# Patient Record
Sex: FEMALE | Race: WHITE | NOT HISPANIC OR LATINO
[De-identification: names, ages, dates, MRNs, and addresses within clinical notes are randomized per-mention and may not be internally consistent; named-entity substitution may affect disease eponyms.]

---

## 2018-10-02 ENCOUNTER — TRANSCRIPTION ENCOUNTER (OUTPATIENT)
Age: 63
End: 2018-10-02

## 2018-10-03 ENCOUNTER — OUTPATIENT (OUTPATIENT)
Dept: OUTPATIENT SERVICES | Facility: HOSPITAL | Age: 63
LOS: 1 days | End: 2018-10-03
Payer: MEDICARE

## 2018-10-03 VITALS
DIASTOLIC BLOOD PRESSURE: 78 MMHG | HEIGHT: 63 IN | HEART RATE: 70 BPM | OXYGEN SATURATION: 97 % | TEMPERATURE: 98 F | WEIGHT: 217.82 LBS | RESPIRATION RATE: 20 BRPM | SYSTOLIC BLOOD PRESSURE: 147 MMHG

## 2018-10-03 VITALS
RESPIRATION RATE: 17 BRPM | OXYGEN SATURATION: 97 % | HEART RATE: 75 BPM | DIASTOLIC BLOOD PRESSURE: 77 MMHG | SYSTOLIC BLOOD PRESSURE: 131 MMHG

## 2018-10-03 DIAGNOSIS — H25.11 AGE-RELATED NUCLEAR CATARACT, RIGHT EYE: ICD-10-CM

## 2018-10-03 DIAGNOSIS — Z98.49 CATARACT EXTRACTION STATUS, UNSPECIFIED EYE: Chronic | ICD-10-CM

## 2018-10-03 DIAGNOSIS — Z90.49 ACQUIRED ABSENCE OF OTHER SPECIFIED PARTS OF DIGESTIVE TRACT: Chronic | ICD-10-CM

## 2018-10-03 PROCEDURE — 66984 XCAPSL CTRC RMVL W/O ECP: CPT | Mod: RT

## 2018-10-03 PROCEDURE — V2788: CPT

## 2018-10-03 NOTE — ASU PATIENT PROFILE, ADULT - PSH
Arm fracture  Left Arm  Foot injury  Right Foot spur repair.  S/P cataract extraction  2013  S/P cholecystectomy

## 2018-10-03 NOTE — ASU PATIENT PROFILE, ADULT - PMH
Cholecystitis    COPD (chronic obstructive pulmonary disease)    Diabetes  pre- diabetes  Hyperlipidemia    Hypertension    Hypothyroidism

## 2018-10-03 NOTE — ASU DISCHARGE PLAN (ADULT/PEDIATRIC). - PT EDUC
Implant card (specify)/Intraocular lens implant (IOL)  , Eye shield with instructions , sunglasses and eye kit given to patient.

## 2021-01-23 ENCOUNTER — FORM ENCOUNTER (OUTPATIENT)
Age: 66
End: 2021-01-23

## 2021-01-24 ENCOUNTER — TRANSCRIPTION ENCOUNTER (OUTPATIENT)
Age: 66
End: 2021-01-24

## 2021-01-26 ENCOUNTER — OUTPATIENT (OUTPATIENT)
Dept: OUTPATIENT SERVICES | Facility: HOSPITAL | Age: 66
LOS: 1 days | End: 2021-01-26
Payer: MEDICARE

## 2021-01-26 ENCOUNTER — APPOINTMENT (OUTPATIENT)
Dept: DISASTER EMERGENCY | Facility: HOSPITAL | Age: 66
End: 2021-01-26

## 2021-01-26 VITALS
SYSTOLIC BLOOD PRESSURE: 157 MMHG | DIASTOLIC BLOOD PRESSURE: 99 MMHG | HEIGHT: 63 IN | HEART RATE: 78 BPM | WEIGHT: 207.01 LBS | TEMPERATURE: 98 F | OXYGEN SATURATION: 97 % | RESPIRATION RATE: 20 BRPM

## 2021-01-26 VITALS
TEMPERATURE: 98 F | SYSTOLIC BLOOD PRESSURE: 130 MMHG | OXYGEN SATURATION: 98 % | DIASTOLIC BLOOD PRESSURE: 80 MMHG | RESPIRATION RATE: 16 BRPM

## 2021-01-26 DIAGNOSIS — U07.1 COVID-19: ICD-10-CM

## 2021-01-26 DIAGNOSIS — Z90.49 ACQUIRED ABSENCE OF OTHER SPECIFIED PARTS OF DIGESTIVE TRACT: Chronic | ICD-10-CM

## 2021-01-26 DIAGNOSIS — Z98.49 CATARACT EXTRACTION STATUS, UNSPECIFIED EYE: Chronic | ICD-10-CM

## 2021-01-26 PROCEDURE — M0239: CPT

## 2021-01-26 RX ORDER — BAMLANIVIMAB 35 MG/ML
700 INJECTION, SOLUTION INTRAVENOUS ONCE
Refills: 0 | Status: COMPLETED | OUTPATIENT
Start: 2021-01-26 | End: 2021-01-26

## 2021-01-26 RX ADMIN — BAMLANIVIMAB 270 MILLIGRAM(S): 35 INJECTION, SOLUTION INTRAVENOUS at 11:28

## 2021-01-26 NOTE — CHART NOTE - NSCHARTNOTEFT_GEN_A_CORE
CC: Monoclonal Antibody Infusion/COVID 19 Positive  65yFemale with PMHx --- and symptoms     exam/findings:  T(C): 36.5 (01-26-21 @ 10:35), Max: 36.5 (01-26-21 @ 10:35)  HR: 78 (01-26-21 @ 10:35) (78 - 78)  BP: 157/99 (01-26-21 @ 10:35) (157/99 - 157/99)  RR: 20 (01-26-21 @ 10:35) (20 - 20)  SpO2: 97% (01-26-21 @ 10:35) (97% - 97%)      PE:   Appearance: NAD	  HEENT:   Normal oral mucosa,   Lymphatic: No lymphadenopathy  Cardiovascular: Normal S1 S2, No JVD, No murmurs, No edema  Respiratory: Lungs clear to auscultation	  Gastrointestinal:  Soft, Non-tender, + BS	  Skin: warm and dry  Neurologic: Non-focal  Extremities: Normal range of motion, no calf tenderness or edema    ASSESSMENT:  Pt is a 65y with PMHx of , Covid 19 Positive with symptoms in the last 10 days, who presents to infusion center for Monoclonal antibody infusion Bamlanivimab  Symptoms/ Criteria: Sore throat, Malaise, HA  Risk Profile includes: Age >65    PLAN:  - infusion procedure explained to patient   -Consent for monoclonal antibody infusion obtained   - Risk & benifits discussed/all questions answered  -infuse  Bamlanivimab  700mg IV over one hour   -observe patient for one hour post infusion      I have reviewed the Bamlanivimab Emergency Use Authorization (EAU) and I have provided the patient or patient's caregiver with the following information:  1. FDA has authorized emergency use of Bamlanivimab, which is not FDA-approved biologic product.  2. The patient or patient's caregiver has the option to accept or refuse administration of Bamlanivimab  3. The significant known and benefits are unknown.  4. Information on available alternative treatments and risks and benefits of those alternatives.

## 2021-01-27 ENCOUNTER — TRANSCRIPTION ENCOUNTER (OUTPATIENT)
Age: 66
End: 2021-01-27

## 2021-10-28 ENCOUNTER — TRANSCRIPTION ENCOUNTER (OUTPATIENT)
Age: 66
End: 2021-10-28

## 2022-07-25 ENCOUNTER — NON-APPOINTMENT (OUTPATIENT)
Age: 67
End: 2022-07-25

## 2022-12-10 ENCOUNTER — NON-APPOINTMENT (OUTPATIENT)
Age: 67
End: 2022-12-10

## 2022-12-22 ENCOUNTER — NON-APPOINTMENT (OUTPATIENT)
Age: 67
End: 2022-12-22

## 2022-12-28 ENCOUNTER — INPATIENT (INPATIENT)
Facility: HOSPITAL | Age: 67
LOS: 2 days | Discharge: HOME | DRG: 193 | End: 2022-12-31
Attending: HOSPITALIST | Admitting: STUDENT IN AN ORGANIZED HEALTH CARE EDUCATION/TRAINING PROGRAM
Payer: MEDICARE

## 2022-12-28 VITALS
HEART RATE: 70 BPM | OXYGEN SATURATION: 94 % | RESPIRATION RATE: 17 BRPM | HEIGHT: 64 IN | TEMPERATURE: 98 F | SYSTOLIC BLOOD PRESSURE: 159 MMHG | WEIGHT: 210.1 LBS | DIASTOLIC BLOOD PRESSURE: 80 MMHG

## 2022-12-28 DIAGNOSIS — J18.9 PNEUMONIA, UNSPECIFIED ORGANISM: ICD-10-CM

## 2022-12-28 DIAGNOSIS — Z98.49 CATARACT EXTRACTION STATUS, UNSPECIFIED EYE: Chronic | ICD-10-CM

## 2022-12-28 DIAGNOSIS — Z90.49 ACQUIRED ABSENCE OF OTHER SPECIFIED PARTS OF DIGESTIVE TRACT: Chronic | ICD-10-CM

## 2022-12-28 LAB
ALBUMIN SERPL ELPH-MCNC: 3.8 G/DL — SIGNIFICANT CHANGE UP (ref 3.3–5)
ALP SERPL-CCNC: 76 U/L — SIGNIFICANT CHANGE UP (ref 40–120)
ALT FLD-CCNC: 50 U/L — SIGNIFICANT CHANGE UP (ref 12–78)
ANION GAP SERPL CALC-SCNC: 7 MMOL/L — SIGNIFICANT CHANGE UP (ref 5–17)
APTT BLD: 30.8 SEC — SIGNIFICANT CHANGE UP (ref 27.5–35.5)
AST SERPL-CCNC: 40 U/L — HIGH (ref 15–37)
BASOPHILS # BLD AUTO: 0.06 K/UL — SIGNIFICANT CHANGE UP (ref 0–0.2)
BASOPHILS NFR BLD AUTO: 0.6 % — SIGNIFICANT CHANGE UP (ref 0–2)
BILIRUB SERPL-MCNC: 0.7 MG/DL — SIGNIFICANT CHANGE UP (ref 0.2–1.2)
BUN SERPL-MCNC: 12 MG/DL — SIGNIFICANT CHANGE UP (ref 7–23)
CALCIUM SERPL-MCNC: 9.2 MG/DL — SIGNIFICANT CHANGE UP (ref 8.5–10.1)
CHLORIDE SERPL-SCNC: 103 MMOL/L — SIGNIFICANT CHANGE UP (ref 96–108)
CK MB CFR SERPL CALC: 1.2 NG/ML — SIGNIFICANT CHANGE UP (ref 0–3.6)
CO2 SERPL-SCNC: 31 MMOL/L — SIGNIFICANT CHANGE UP (ref 22–31)
CREAT SERPL-MCNC: 0.77 MG/DL — SIGNIFICANT CHANGE UP (ref 0.5–1.3)
EGFR: 83 ML/MIN/1.73M2 — SIGNIFICANT CHANGE UP
EOSINOPHIL # BLD AUTO: 0.15 K/UL — SIGNIFICANT CHANGE UP (ref 0–0.5)
EOSINOPHIL NFR BLD AUTO: 1.5 % — SIGNIFICANT CHANGE UP (ref 0–6)
GLUCOSE SERPL-MCNC: 106 MG/DL — HIGH (ref 70–99)
HCT VFR BLD CALC: 47.8 % — HIGH (ref 34.5–45)
HGB BLD-MCNC: 15.2 G/DL — SIGNIFICANT CHANGE UP (ref 11.5–15.5)
IMM GRANULOCYTES NFR BLD AUTO: 1.6 % — HIGH (ref 0–0.9)
INR BLD: 1.22 RATIO — HIGH (ref 0.88–1.16)
LACTATE SERPL-SCNC: 1.2 MMOL/L — SIGNIFICANT CHANGE UP (ref 0.7–2)
LYMPHOCYTES # BLD AUTO: 1.96 K/UL — SIGNIFICANT CHANGE UP (ref 1–3.3)
LYMPHOCYTES # BLD AUTO: 19.2 % — SIGNIFICANT CHANGE UP (ref 13–44)
MAGNESIUM SERPL-MCNC: 2.3 MG/DL — SIGNIFICANT CHANGE UP (ref 1.6–2.6)
MCHC RBC-ENTMCNC: 28.5 PG — SIGNIFICANT CHANGE UP (ref 27–34)
MCHC RBC-ENTMCNC: 31.8 GM/DL — LOW (ref 32–36)
MCV RBC AUTO: 89.7 FL — SIGNIFICANT CHANGE UP (ref 80–100)
MONOCYTES # BLD AUTO: 0.69 K/UL — SIGNIFICANT CHANGE UP (ref 0–0.9)
MONOCYTES NFR BLD AUTO: 6.8 % — SIGNIFICANT CHANGE UP (ref 2–14)
NEUTROPHILS # BLD AUTO: 7.19 K/UL — SIGNIFICANT CHANGE UP (ref 1.8–7.4)
NEUTROPHILS NFR BLD AUTO: 70.3 % — SIGNIFICANT CHANGE UP (ref 43–77)
NRBC # BLD: 0 /100 WBCS — SIGNIFICANT CHANGE UP (ref 0–0)
NT-PROBNP SERPL-SCNC: 272 PG/ML — HIGH (ref 0–125)
PLATELET # BLD AUTO: 346 K/UL — SIGNIFICANT CHANGE UP (ref 150–400)
POTASSIUM SERPL-MCNC: 4.2 MMOL/L — SIGNIFICANT CHANGE UP (ref 3.5–5.3)
POTASSIUM SERPL-SCNC: 4.2 MMOL/L — SIGNIFICANT CHANGE UP (ref 3.5–5.3)
PROT SERPL-MCNC: 8.1 G/DL — SIGNIFICANT CHANGE UP (ref 6–8.3)
PROTHROM AB SERPL-ACNC: 14.3 SEC — HIGH (ref 10.5–13.4)
RBC # BLD: 5.33 M/UL — HIGH (ref 3.8–5.2)
RBC # FLD: 13.6 % — SIGNIFICANT CHANGE UP (ref 10.3–14.5)
SODIUM SERPL-SCNC: 141 MMOL/L — SIGNIFICANT CHANGE UP (ref 135–145)
TROPONIN I, HIGH SENSITIVITY RESULT: 6.8 NG/L — SIGNIFICANT CHANGE UP
WBC # BLD: 10.21 K/UL — SIGNIFICANT CHANGE UP (ref 3.8–10.5)
WBC # FLD AUTO: 10.21 K/UL — SIGNIFICANT CHANGE UP (ref 3.8–10.5)

## 2022-12-28 PROCEDURE — 93010 ELECTROCARDIOGRAM REPORT: CPT

## 2022-12-28 PROCEDURE — 99291 CRITICAL CARE FIRST HOUR: CPT

## 2022-12-28 PROCEDURE — 99223 1ST HOSP IP/OBS HIGH 75: CPT | Mod: GC

## 2022-12-28 PROCEDURE — 71045 X-RAY EXAM CHEST 1 VIEW: CPT | Mod: 26

## 2022-12-28 RX ORDER — AZITHROMYCIN 500 MG/1
500 TABLET, FILM COATED ORAL ONCE
Refills: 0 | Status: COMPLETED | OUTPATIENT
Start: 2022-12-28 | End: 2022-12-28

## 2022-12-28 RX ORDER — ENOXAPARIN SODIUM 100 MG/ML
40 INJECTION SUBCUTANEOUS EVERY 24 HOURS
Refills: 0 | Status: DISCONTINUED | OUTPATIENT
Start: 2022-12-28 | End: 2022-12-31

## 2022-12-28 RX ORDER — ALBUTEROL 90 UG/1
2 AEROSOL, METERED ORAL EVERY 6 HOURS
Refills: 0 | Status: DISCONTINUED | OUTPATIENT
Start: 2022-12-28 | End: 2022-12-29

## 2022-12-28 RX ORDER — SODIUM CHLORIDE 9 MG/ML
1000 INJECTION INTRAMUSCULAR; INTRAVENOUS; SUBCUTANEOUS ONCE
Refills: 0 | Status: COMPLETED | OUTPATIENT
Start: 2022-12-28 | End: 2022-12-28

## 2022-12-28 RX ORDER — ACETAMINOPHEN 500 MG
650 TABLET ORAL EVERY 6 HOURS
Refills: 0 | Status: DISCONTINUED | OUTPATIENT
Start: 2022-12-28 | End: 2022-12-31

## 2022-12-28 RX ORDER — LEVOTHYROXINE SODIUM 125 MCG
1 TABLET ORAL
Qty: 0 | Refills: 0 | DISCHARGE

## 2022-12-28 RX ORDER — LEVOTHYROXINE SODIUM 125 MCG
100 TABLET ORAL DAILY
Refills: 0 | Status: DISCONTINUED | OUTPATIENT
Start: 2022-12-28 | End: 2022-12-31

## 2022-12-28 RX ORDER — IPRATROPIUM/ALBUTEROL SULFATE 18-103MCG
3 AEROSOL WITH ADAPTER (GRAM) INHALATION ONCE
Refills: 0 | Status: COMPLETED | OUTPATIENT
Start: 2022-12-28 | End: 2022-12-28

## 2022-12-28 RX ORDER — ALBUTEROL 90 UG/1
2 AEROSOL, METERED ORAL
Qty: 0 | Refills: 0 | DISCHARGE

## 2022-12-28 RX ORDER — CEFTRIAXONE 500 MG/1
1000 INJECTION, POWDER, FOR SOLUTION INTRAMUSCULAR; INTRAVENOUS ONCE
Refills: 0 | Status: COMPLETED | OUTPATIENT
Start: 2022-12-28 | End: 2022-12-28

## 2022-12-28 RX ADMIN — Medication 3 MILLILITER(S): at 21:27

## 2022-12-28 RX ADMIN — SODIUM CHLORIDE 1000 MILLILITER(S): 9 INJECTION INTRAMUSCULAR; INTRAVENOUS; SUBCUTANEOUS at 20:24

## 2022-12-28 RX ADMIN — AZITHROMYCIN 255 MILLIGRAM(S): 500 TABLET, FILM COATED ORAL at 22:21

## 2022-12-28 RX ADMIN — Medication 3 MILLILITER(S): at 21:23

## 2022-12-28 RX ADMIN — CEFTRIAXONE 100 MILLIGRAM(S): 500 INJECTION, POWDER, FOR SOLUTION INTRAMUSCULAR; INTRAVENOUS at 22:06

## 2022-12-28 RX ADMIN — Medication 125 MILLIGRAM(S): at 20:23

## 2022-12-28 NOTE — H&P ADULT - PROBLEM SELECTOR PLAN 2
Acute hypoxic respiratory failure 2/2 PNA and COPD exacerbation  - Pt not on home O2  - S/p Solumedrol 125 mg IVP x1 in ED  - Start Solumedrol 60 mg IVP q6h with plans for taper  - Pulmonary (Dr. Whaley) consulted, f/u recs  Remainder of plan as above Acute hypoxic respiratory failure 2/2 PNA and COPD exacerbation  - Pt not on home O2  - S/p Solumedrol 125 mg IVP x1 in ED  - Start Solumedrol 40 mg IVP q8h with plans for taper  - Pulmonary (Dr. Whaley) consulted, f/u recs  Remainder of plan as above Acute hypoxic respiratory failure 2/2 PNA and COPD exacerbation  - Pt not on home O2  - S/p Solumedrol 125 mg IVP x1 in ED  - Start Solumedrol 40 mg IVP q8h with plans for taper  - duonebs q6   - Pulmonary (Dr. Whaley) consulted, f/u recs  Remainder of plan as above

## 2022-12-28 NOTE — ED ADULT NURSE NOTE - NSICDXPASTSURGICALHX_GEN_ALL_CORE_FT
PAST SURGICAL HISTORY:  Arm fracture Left Arm    Foot injury Right Foot spur repair.    S/P cataract extraction 2013    S/P cholecystectomy

## 2022-12-28 NOTE — ED PROVIDER NOTE - NSICDXPASTSURGICALHX_GEN_ALL_CORE_FT
never used
PAST SURGICAL HISTORY:  Arm fracture Left Arm    Foot injury Right Foot spur repair.    S/P cataract extraction 2013    S/P cholecystectomy

## 2022-12-28 NOTE — ED PROVIDER NOTE - CARE PLAN
1 Principal Discharge DX:	Pneumonia  Secondary Diagnosis:	Respiratory distress  Secondary Diagnosis:	Respiratory failure, unspecified with hypoxia

## 2022-12-28 NOTE — ED ADULT NURSE NOTE - OBJECTIVE STATEMENT
67yr old female a&ox4 from home noted to have been dx with pneumonia and on PO antibx since Friday, but notes weekends and cant being able to catch breath, no wheezing noted pt noted to be able to speak in full sentences without becoming sob, pt denies chest pain or sob at this time, pt admit to hx of copd. Pt denies fever chills, n/c at this time.

## 2022-12-28 NOTE — ED PROVIDER NOTE - CLINICAL SUMMARY MEDICAL DECISION MAKING FREE TEXT BOX
67-year-old female history of COPD complaining of cough congestion wheezing, O2 sats in the 80s, placed on nasal cannula oxygen, to get DuoNeb's, steroids, send CBC, CMP, blood cultures, lactate level, chest x-ray, treat for pneumonia, patient to be admitted.

## 2022-12-28 NOTE — ED ADULT NURSE NOTE - NSICDXFAMILYHX_GEN_ALL_CORE_FT
FAMILY HISTORY:  Father  Still living? No  Family history of stroke, Age at diagnosis: Age Unknown    Mother  Still living? Yes, Estimated age: 81-90  Hypertension, Age at diagnosis: Age Unknown    Sibling  Still living? Yes, Estimated age: 61-70  Family history of cervical cancer, Age at diagnosis: Age Unknown  Family history of coronary artery disease, Age at diagnosis: Age Unknown

## 2022-12-28 NOTE — ED PROVIDER NOTE - CPE EDP NEURO NORM
normal...

## 2022-12-28 NOTE — ED ADULT TRIAGE NOTE - CHIEF COMPLAINT QUOTE
"I had an XRAY of my chest and they say I have pneumonia in my right lower lung. Teresa been on antibx since Friday but I feel weaker and I cant catch my breath"

## 2022-12-28 NOTE — ED ADULT NURSE NOTE - NS ED NOTE ABUSE RESPONSE YN
Physical Therapy  Visit Type: initial evaluation and treatment  Precautions:  Medical precautions:  fall risk;.  Pt adm to [facility] cellulitis   PT/OT vidal AAT    Therapist wearing gloves and surgical mask during session.    Patient was NOT wearing mask throughout duration of therapy session  .     Lines:     Basic: capped IV      Lines in chart and on patient reviewed, precautions maintained throughout session.  Safety Measures: bed alarm and bed rails    SUBJECTIVE  Patient agreed to participate in therapy this date.  \"My daughter came to live with me when my  \"  Patient / Family Goal: maximize function     OBJECTIVE     Oriented to person, place, time and situation     Affect/Behavior: appropriate and cooperative  Functional Communication/Cognition    Overall status:  Within functional limits    Form of communication:  Verbal   Attention span:  Appears intact    Commands: follows all commands and directions consistently.    Transition between tasks: transition with cues.    Safety judgement: good awareness of safety precautions.    Awareness of deficits: fully aware of deficits.  Range of Motion (measured in degrees unless otherwise noted, active unless indicated)  WFL: RLE, LLE  Strength (out of 5 unless otherwise indicated)   WFL: LLE, RLE  Balance (trials measured in sec unless otherwise indicted)    Sitting: Static: supervision double lower extremity support and back unsupported    Standing - Firm Surface - Eyes Open: Static: supervision double upper extremity support    Bed Mobility:        Supine to sit: moderate assist, with tactile cues and with verbal cues    Sit to supine: moderate assist, with tactile cues and with verbal cues  Training completed:    Tasks: sit to supine and supine to sit    Education details: body mechanics and patient safety    Pt sleeps in a recliner lift chair  Transfers:    Assistive devices: 2-wheeled walker and gait belt    Sit to stand: supervision, with  verbal cues and with tactile cues    Stand to sit: supervision, with verbal cues and with tactile cues  Training completed:    Tasks: sit to stand and stand to sit    Education details: patient safety and patient demonstrates understanding  Gait/Ambulation:     Assistance: supervision   Assistive device: 2-wheeled walker and gait belt    Distance (ft): 30    Pattern: step through    Type: decreased iveth    Surface: even  Training Completed:    Tasks: gait training on level surfaces    Education details: body mechanics, patient safety and patient demonstrates understanding    Pt states her walking is at baseline. She amb with rollator at home and stops for rest breaks when needed. Does short household distances  Stair Mobility:    Training completed:      Not addressed d/t fatigue. Pt will have assist from family to enter home 1STE.       Interventions     Skilled input: Verbal instruction/cues and tactile instruction/cues  Verbal Consent: Writer verbally educated and received verbal consent for hand placement, positioning of patient, and techniques to be performed today from patient for clothing adjustments for techniques and therapist position for techniques as described above and how they are pertinent to the patient's plan of care.       ASSESSMENT   Impairments: safety awareness, balance deficits and activity tolerance  Functional Limitations: all functional mobility     Discharge Recommendations   Recommendation for Discharge: PT IL: Patient requires 24 HOUR assistance to perform mobility and/or ADLs safely        PT/OT Mobility Equipment for Discharge: has Rollator          Therapy Diagnosis:  Other Abnormalities of Gait and Mobility      Progress: progressing toward goals     • Skilled therapy is not required due to pt at baseline with 24/7 assist      • Predicted patient presentation: Low (stable) - Patient comorbidities and complexities, as defined above, will have little effect on progress for  prescribed plan of care.    Education Provided:   Learning Assessment:  - Primary learner: patient  - Are they ready to learn: yes  - Preferred learning style: verbal  Education provided during session:  - Receiving Education: patient  - Results of above outlined education: Verbalizes understanding and Demonstrates understanding    Patient at End of Session:   Location: in bed  Safety measures: alarm system in place/re-engaged, call light within reach and bed rails x4  Handoff to: nurse    PLAN   Suggestions for next session as indicated: Frequency Comments: d/c INPT as pt is at baseline and has 24/7 assist at discharge home 9.4    A minimum of 8 minutes per session x 1 week.  Interventions: bed mobility, functional transfer training, safety education and gait training  Agreement to plan and goals: patient agrees with goals and treatment plan        GOALS  Review Date: 9/4/2022Documented in the chart in the following areas: Prior Level of Function. Pain. Assessment.      Therapy procedure time and total treatment time can be found documented on the Time Entry flowsheet   Yes

## 2022-12-28 NOTE — ED ADULT NURSE NOTE - NSICDXPASTMEDICALHX_GEN_ALL_CORE_FT
PAST MEDICAL HISTORY:  Cholecystitis     COPD (chronic obstructive pulmonary disease)     Diabetes pre- diabetes    Hyperlipidemia     Hypertension     Hypothyroidism

## 2022-12-28 NOTE — ED PROVIDER NOTE - OBJECTIVE STATEMENT
67-year-old female past medical history of hypertension, COPD, former smoker, hypothyroid, presents to the ER complaining of shortness of breath cough congestion.  Patient states that on Friday he developed cough and congestion and went to urgent care tested negative for COVID was given albuterol inhaler and placed on Levaquin.  Patient states that today she feels worse, feels short of breath generalized weakness and came to the ER.

## 2022-12-28 NOTE — H&P ADULT - ATTENDING COMMENTS
67 year old female PMHx HTN, mild COPD, hypothyroidism admitted for acute hypoxic respiratory failure 2/2 PNA and COPD exacerbation.     Agree with H&P as above. Edited where appropriate in the body of the note.

## 2022-12-28 NOTE — H&P ADULT - HISTORY OF PRESENT ILLNESS
67 year old female PMHx HTN, mild COPD, hypothyroidism presents to ED c/o shortness of breath. Pt reports she developed fever/chills, productive cough with white phlegm, myalgias, shortness of breath on Fri 12/23. She presented to urgent care on Fri and had a CXR which revealed RLL PNA. She was started on Levaquin 750mg qd x7 days. Pt has been taking the abx as prescribed with little improvement. She admits her shortness of breath has been worsening which prompted her to present to the ED. Pt admits she has been feeling anxious and using PRN Albuterol 4-6x daily. She denies fevers/chills, headaches, sore throat, chest pain, abdominal pain, nausea/vomiting.     ED course:  Vitals: T 97.7F, HR 70, /80, RR 17, SpO2 94% on 2L NC  Labs: RBC 5.33, Hct 47.8, PT 14.3, INR 1.22, glucose 106, AST 40, proBNP 272  EKG: pending  CXR: RLL infiltrates on CXR per wet read  Given: IV Zithromax 500mg x1, IV Rocephin 1x1, IV Solumedrol 125mg x1, IV NS 1L x1, Duoneb x2 67 year old female PMHx HTN, mild COPD, hypothyroidism presents to ED c/o shortness of breath. Pt reports she developed fever/chills, productive cough with white phlegm, myalgias, shortness of breath on Fri 12/23. She presented to urgent care on Fri and had a CXR which revealed RLL PNA. She was started on Levaquin 750mg qd x7 days. Pt has been taking the abx as prescribed with little improvement. She admits her shortness of breath has been worsening which prompted her to present to the ED. Pt admits she has been feeling anxious and using PRN Albuterol 4-6x daily. She denies fevers/chills, headaches, sore throat, chest pain, abdominal pain, nausea/vomiting.     ED course:  Vitals: T 97.7F, HR 70, /80, RR 17, SpO2 94% on 2L NC  Labs: RBC 5.33, Hct 47.8, PT 14.3, INR 1.22, glucose 106, AST 40, proBNP 272  CXR: RLL infiltrates on CXR per wet read  Given: IV Zithromax 500mg x1, IV Rocephin 1x1, IV Solumedrol 125mg x1, IV NS 1L x1, Duoneb x2

## 2022-12-28 NOTE — H&P ADULT - PROBLEM SELECTOR PLAN 1
Pt presents with SOB, wheezing due to Acute hypoxic respiratory failure 2/2 PNA and COPD exacerbation  - RLL infiltrates on CXR per wet read. F/u official read  - Given IV Zithromax 500mg x1, IV Rocephin 1x1, IV Solumedrol 125mg x1, IV NS 1L x1, Duoneb x2 in ED  - Continue IV Zithromax and IV Rocephin   - Duonebs q6h   - F/u RVP  - Follow up blood cultures, legionella urine antigen, strep pneumo urine antigen, MRSA PCR  - Tylenol 650 mg PO q6h PRN mild pain and/or fever  - Albuterol PRN SOB and/or wheezing  - Supplemental O2 PRN  - Monitor fever and WBC curve  - ID (Dr. Blakely) consulted, f/u recs Pt presents with SOB, wheezing due to Acute hypoxic respiratory failure 2/2 PNA and COPD exacerbation  - O2 sat in the 80s on RA   - RLL infiltrates on CXR per wet read. F/u official read  - Given IV Zithromax 500mg x1, IV Rocephin 1x1, IV Solumedrol 125mg x1, IV NS 1L x1, Duoneb x2 in ED  - Continue IV Zithromax and IV Rocephin   - Duonebs q6h   - F/u RVP  - Follow up blood cultures, legionella urine antigen, strep pneumo urine antigen, MRSA PCR  - Tylenol 650 mg PO q6h PRN mild pain and/or fever  - Albuterol PRN SOB and/or wheezing  - Supplemental O2 PRN  - Monitor fever and WBC curve  - ID (Dr. Blakely) consulted, f/u recs

## 2022-12-28 NOTE — H&P ADULT - NSHPREVIEWOFSYSTEMS_GEN_ALL_CORE
General: no fever, chills, changes in appetite  HEENT: admits to cough productive of white phlegm and rhinorrhea. no sore throat, headache, changes in vision  Cardio: no palpitations, pallor, chest pain or discomfort  Pulm: admits to shortness of breath and wheezing  GI: no vomiting, diarrhea, abdominal pain   /Renal: no dysuria, foul smelling urine, increased frequency, flank pain  MSK: no back or extremity pain, no edema, joint pain or swelling, gait changes  Endo: no temperature intolerance  Heme: no bruising or abnormal bleeding  Skin: no rash

## 2022-12-28 NOTE — H&P ADULT - NSICDXPASTMEDICALHX_GEN_ALL_CORE_FT
PAST MEDICAL HISTORY:  COPD (chronic obstructive pulmonary disease)     Hypertension     Hypothyroidism

## 2022-12-28 NOTE — H&P ADULT - ASSESSMENT
67 year old female PMHx HTN, mild COPD, hypothyroidism admitted for acute hypoxic respiratory failure 2/2 PNA and COPD exacerbation.

## 2022-12-28 NOTE — H&P ADULT - NSHPSOCIALHISTORY_GEN_ALL_CORE
Tobacco: quit smoking 15 years ago  EtOH: denies  Recreational drug use: denies  Lives with: fiance at home  Ambulates: independent  ADLs: independent  Vaccinations: declines flu vaccine, COVID J&J

## 2022-12-28 NOTE — H&P ADULT - NSHPPHYSICALEXAM_GEN_ALL_CORE
VITALS:   T(C): 36.5 (12-28-22 @ 18:25), Max: 36.5 (12-28-22 @ 18:25)  HR: 70 (12-28-22 @ 18:25) (70 - 70)  BP: 159/80 (12-28-22 @ 18:25) (159/80 - 159/80)  RR: 17 (12-28-22 @ 18:25) (17 - 17)  SpO2: 94% (12-28-22 @ 18:25) (94% - 94%)    GENERAL: in mild respiratory distress s/p ambulation to the bathroom  HEAD:  Atraumatic, Normocephalic  EYES: EOMI, PERRLA, conjunctiva and sclera clear  ENT: Moist mucous membranes. no pharyngeal erythema or exudates  CHEST/LUNG: Clear to auscultation bilaterally; No rales, rhonchi, wheezing, or rubs  HEART: Regular rate and rhythm; No murmurs, rubs, or gallops  ABDOMEN: Soft, nontender, nondistended  EXTREMITIES:  2+ Peripheral Pulses, No clubbing, cyanosis, or edema  NERVOUS SYSTEM:  AAOx3, no focal deficits   SKIN: warm, dry

## 2022-12-29 DIAGNOSIS — E03.9 HYPOTHYROIDISM, UNSPECIFIED: ICD-10-CM

## 2022-12-29 DIAGNOSIS — J44.9 CHRONIC OBSTRUCTIVE PULMONARY DISEASE, UNSPECIFIED: ICD-10-CM

## 2022-12-29 DIAGNOSIS — I10 ESSENTIAL (PRIMARY) HYPERTENSION: ICD-10-CM

## 2022-12-29 DIAGNOSIS — Z29.9 ENCOUNTER FOR PROPHYLACTIC MEASURES, UNSPECIFIED: ICD-10-CM

## 2022-12-29 DIAGNOSIS — J44.1 CHRONIC OBSTRUCTIVE PULMONARY DISEASE WITH (ACUTE) EXACERBATION: ICD-10-CM

## 2022-12-29 DIAGNOSIS — J96.01 ACUTE RESPIRATORY FAILURE WITH HYPOXIA: ICD-10-CM

## 2022-12-29 DIAGNOSIS — J18.9 PNEUMONIA, UNSPECIFIED ORGANISM: ICD-10-CM

## 2022-12-29 LAB
ALBUMIN SERPL ELPH-MCNC: 3.6 G/DL — SIGNIFICANT CHANGE UP (ref 3.3–5)
ALP SERPL-CCNC: 70 U/L — SIGNIFICANT CHANGE UP (ref 40–120)
ALT FLD-CCNC: 48 U/L — SIGNIFICANT CHANGE UP (ref 12–78)
ANION GAP SERPL CALC-SCNC: 8 MMOL/L — SIGNIFICANT CHANGE UP (ref 5–17)
AST SERPL-CCNC: 32 U/L — SIGNIFICANT CHANGE UP (ref 15–37)
BASE EXCESS BLDV CALC-SCNC: 4.8 MMOL/L — HIGH (ref -2–3)
BASOPHILS # BLD AUTO: 0.01 K/UL — SIGNIFICANT CHANGE UP (ref 0–0.2)
BASOPHILS NFR BLD AUTO: 0.1 % — SIGNIFICANT CHANGE UP (ref 0–2)
BILIRUB SERPL-MCNC: 0.4 MG/DL — SIGNIFICANT CHANGE UP (ref 0.2–1.2)
BLOOD GAS COMMENTS, VENOUS: SIGNIFICANT CHANGE UP
BUN SERPL-MCNC: 13 MG/DL — SIGNIFICANT CHANGE UP (ref 7–23)
CALCIUM SERPL-MCNC: 8.9 MG/DL — SIGNIFICANT CHANGE UP (ref 8.5–10.1)
CHLORIDE SERPL-SCNC: 106 MMOL/L — SIGNIFICANT CHANGE UP (ref 96–108)
CO2 SERPL-SCNC: 27 MMOL/L — SIGNIFICANT CHANGE UP (ref 22–31)
CREAT SERPL-MCNC: 0.96 MG/DL — SIGNIFICANT CHANGE UP (ref 0.5–1.3)
CRP SERPL-MCNC: <3 MG/L — SIGNIFICANT CHANGE UP
EGFR: 64 ML/MIN/1.73M2 — SIGNIFICANT CHANGE UP
EOSINOPHIL # BLD AUTO: 0 K/UL — SIGNIFICANT CHANGE UP (ref 0–0.5)
EOSINOPHIL NFR BLD AUTO: 0 % — SIGNIFICANT CHANGE UP (ref 0–6)
FLUAV AG NPH QL: SIGNIFICANT CHANGE UP
FLUBV AG NPH QL: SIGNIFICANT CHANGE UP
GLUCOSE SERPL-MCNC: 188 MG/DL — HIGH (ref 70–99)
HCO3 BLDV-SCNC: 32 MMOL/L — HIGH (ref 22–29)
HCT VFR BLD CALC: 45.8 % — HIGH (ref 34.5–45)
HCV AB S/CO SERPL IA: 0.09 S/CO — SIGNIFICANT CHANGE UP (ref 0–0.99)
HCV AB SERPL-IMP: SIGNIFICANT CHANGE UP
HGB BLD-MCNC: 14.4 G/DL — SIGNIFICANT CHANGE UP (ref 11.5–15.5)
IMM GRANULOCYTES NFR BLD AUTO: 1.2 % — HIGH (ref 0–0.9)
LEGIONELLA AG UR QL: NEGATIVE — SIGNIFICANT CHANGE UP
LYMPHOCYTES # BLD AUTO: 0.8 K/UL — LOW (ref 1–3.3)
LYMPHOCYTES # BLD AUTO: 8.7 % — LOW (ref 13–44)
MCHC RBC-ENTMCNC: 28.5 PG — SIGNIFICANT CHANGE UP (ref 27–34)
MCHC RBC-ENTMCNC: 31.4 GM/DL — LOW (ref 32–36)
MCV RBC AUTO: 90.5 FL — SIGNIFICANT CHANGE UP (ref 80–100)
MONOCYTES # BLD AUTO: 0.19 K/UL — SIGNIFICANT CHANGE UP (ref 0–0.9)
MONOCYTES NFR BLD AUTO: 2.1 % — SIGNIFICANT CHANGE UP (ref 2–14)
NEUTROPHILS # BLD AUTO: 8.04 K/UL — HIGH (ref 1.8–7.4)
NEUTROPHILS NFR BLD AUTO: 87.9 % — HIGH (ref 43–77)
NRBC # BLD: 0 /100 WBCS — SIGNIFICANT CHANGE UP (ref 0–0)
PCO2 BLDV: 67 MMHG — HIGH (ref 39–42)
PH BLDV: 7.28 — LOW (ref 7.32–7.43)
PLATELET # BLD AUTO: 348 K/UL — SIGNIFICANT CHANGE UP (ref 150–400)
PO2 BLDV: 55 MMHG — HIGH (ref 25–45)
POTASSIUM SERPL-MCNC: 4.1 MMOL/L — SIGNIFICANT CHANGE UP (ref 3.5–5.3)
POTASSIUM SERPL-SCNC: 4.1 MMOL/L — SIGNIFICANT CHANGE UP (ref 3.5–5.3)
PROCALCITONIN SERPL-MCNC: 0.06 NG/ML — SIGNIFICANT CHANGE UP
PROT SERPL-MCNC: 8 G/DL — SIGNIFICANT CHANGE UP (ref 6–8.3)
RAPID RVP RESULT: SIGNIFICANT CHANGE UP
RBC # BLD: 5.06 M/UL — SIGNIFICANT CHANGE UP (ref 3.8–5.2)
RBC # FLD: 13.4 % — SIGNIFICANT CHANGE UP (ref 10.3–14.5)
RSV RNA NPH QL NAA+NON-PROBE: SIGNIFICANT CHANGE UP
S PNEUM AG UR QL: NEGATIVE — SIGNIFICANT CHANGE UP
SAO2 % BLDV: 83.5 % — SIGNIFICANT CHANGE UP (ref 67–88)
SARS-COV-2 RNA SPEC QL NAA+PROBE: SIGNIFICANT CHANGE UP
SARS-COV-2 RNA SPEC QL NAA+PROBE: SIGNIFICANT CHANGE UP
SODIUM SERPL-SCNC: 141 MMOL/L — SIGNIFICANT CHANGE UP (ref 135–145)
TSH SERPL-MCNC: 1.33 UIU/ML — SIGNIFICANT CHANGE UP (ref 0.36–3.74)
WBC # BLD: 9.15 K/UL — SIGNIFICANT CHANGE UP (ref 3.8–10.5)
WBC # FLD AUTO: 9.15 K/UL — SIGNIFICANT CHANGE UP (ref 3.8–10.5)

## 2022-12-29 PROCEDURE — 99233 SBSQ HOSP IP/OBS HIGH 50: CPT | Mod: GC

## 2022-12-29 PROCEDURE — 71250 CT THORAX DX C-: CPT | Mod: 26

## 2022-12-29 PROCEDURE — 99221 1ST HOSP IP/OBS SF/LOW 40: CPT

## 2022-12-29 RX ORDER — CEFTRIAXONE 500 MG/1
1000 INJECTION, POWDER, FOR SOLUTION INTRAMUSCULAR; INTRAVENOUS EVERY 24 HOURS
Refills: 0 | Status: DISCONTINUED | OUTPATIENT
Start: 2022-12-29 | End: 2022-12-31

## 2022-12-29 RX ORDER — IPRATROPIUM/ALBUTEROL SULFATE 18-103MCG
3 AEROSOL WITH ADAPTER (GRAM) INHALATION EVERY 6 HOURS
Refills: 0 | Status: DISCONTINUED | OUTPATIENT
Start: 2022-12-29 | End: 2022-12-29

## 2022-12-29 RX ORDER — ALBUTEROL 90 UG/1
2.5 AEROSOL, METERED ORAL
Refills: 0 | Status: DISCONTINUED | OUTPATIENT
Start: 2022-12-29 | End: 2022-12-31

## 2022-12-29 RX ORDER — TIOTROPIUM BROMIDE 18 UG/1
2 CAPSULE ORAL; RESPIRATORY (INHALATION) DAILY
Refills: 0 | Status: DISCONTINUED | OUTPATIENT
Start: 2022-12-29 | End: 2022-12-31

## 2022-12-29 RX ORDER — INFLUENZA VIRUS VACCINE 15; 15; 15; 15 UG/.5ML; UG/.5ML; UG/.5ML; UG/.5ML
0.7 SUSPENSION INTRAMUSCULAR ONCE
Refills: 0 | Status: DISCONTINUED | OUTPATIENT
Start: 2022-12-29 | End: 2022-12-31

## 2022-12-29 RX ORDER — METOPROLOL TARTRATE 50 MG
12.5 TABLET ORAL
Refills: 0 | Status: DISCONTINUED | OUTPATIENT
Start: 2022-12-29 | End: 2022-12-31

## 2022-12-29 RX ORDER — AZITHROMYCIN 500 MG/1
500 TABLET, FILM COATED ORAL EVERY 24 HOURS
Refills: 0 | Status: DISCONTINUED | OUTPATIENT
Start: 2022-12-29 | End: 2022-12-29

## 2022-12-29 RX ORDER — BUDESONIDE AND FORMOTEROL FUMARATE DIHYDRATE 160; 4.5 UG/1; UG/1
2 AEROSOL RESPIRATORY (INHALATION)
Refills: 0 | Status: DISCONTINUED | OUTPATIENT
Start: 2022-12-29 | End: 2022-12-31

## 2022-12-29 RX ADMIN — Medication 40 MILLIGRAM(S): at 06:30

## 2022-12-29 RX ADMIN — BUDESONIDE AND FORMOTEROL FUMARATE DIHYDRATE 2 PUFF(S): 160; 4.5 AEROSOL RESPIRATORY (INHALATION) at 23:21

## 2022-12-29 RX ADMIN — ENOXAPARIN SODIUM 40 MILLIGRAM(S): 100 INJECTION SUBCUTANEOUS at 00:49

## 2022-12-29 RX ADMIN — Medication 3 MILLILITER(S): at 00:49

## 2022-12-29 RX ADMIN — ENOXAPARIN SODIUM 40 MILLIGRAM(S): 100 INJECTION SUBCUTANEOUS at 23:28

## 2022-12-29 RX ADMIN — TIOTROPIUM BROMIDE 2 PUFF(S): 18 CAPSULE ORAL; RESPIRATORY (INHALATION) at 13:51

## 2022-12-29 RX ADMIN — CEFTRIAXONE 100 MILLIGRAM(S): 500 INJECTION, POWDER, FOR SOLUTION INTRAMUSCULAR; INTRAVENOUS at 21:17

## 2022-12-29 RX ADMIN — Medication 3 MILLILITER(S): at 07:31

## 2022-12-29 RX ADMIN — Medication 12.5 MILLIGRAM(S): at 06:31

## 2022-12-29 RX ADMIN — Medication 50 MILLIGRAM(S): at 11:58

## 2022-12-29 RX ADMIN — Medication 100 MICROGRAM(S): at 06:31

## 2022-12-29 RX ADMIN — Medication 12.5 MILLIGRAM(S): at 18:17

## 2022-12-29 NOTE — CONSULT NOTE ADULT - SUBJECTIVE AND OBJECTIVE BOX
[facility]  INFECTIOUS DISEASES - Koko Blakely, [place] [address]  Tel: [phone]     Fax: [phone]  =======================================================    [id number]  CLIFTON LEY     CC: Patient is a 67y old  Female who presents with a chief complaint of Acute hypoxic respiratory failure 2/2 PNA and COPD exacerbation (29 Dec 2022 08:36)    HPI:  67 year old female PMHx HTN, COPD, hypothyroidism, who presented with worsening shortness of breath. Pt reports she developed fever/chills, productive cough with white phlegm, myalgias, shortness of breath on . She presented to urgent care on Fri and had a CXR which revealed RLL PNA. She was started on Levaquin 750mg qd x7 days, of which she completed 6 days. She says she has been taking albuterol 4-6x a dayPrior to this also took a few days of amoxicillin for respiratory infection,.     Pt has been taking the abx as prescribed with little improvement. She admits her shortness of breath has been worsening which prompted her to present to the ED. Pt admits she has been feeling anxious and using PRN Albuterol 4-6x daily. She denies fevers/chills, headaches, sore throat, chest pain, abdominal pain, nausea/vomiting.     ED course:  Vitals: T 97.7F, HR 70, /80, RR 17, SpO2 94% on 2L NC  Labs: RBC 5.33, Hct 47.8, PT 14.3, INR 1.22, glucose 106, AST 40, proBNP 272  CXR: RLL infiltrates on CXR per wet read  Given: IV Zithromax 500mg x1, IV Rocephin 1x1, IV Solumedrol 125mg x1, IV NS 1L x1, Duoneb x2 (28 Dec 2022 23:43)      PAST MEDICAL & SURGICAL HISTORY:  Hypothyroidism      COPD (chronic obstructive pulmonary disease)      Hypertension      Arm fracture  Left Arm      Foot injury  Right Foot spur repair.      S/P cataract extraction        S/P cholecystectomy          Social Hx:     FAMILY HISTORY:  Hypertension (Mother)  Mother Alive Age 88    Family history of stroke (Father)  Father -  age 78  Also h/o Coronary artery disease    Family history of cervical cancer (Sibling)  Sister -  age 61    Family history of coronary artery disease (Sibling)  Brother - Alive age 63 - s/p Triple Bypass followed by Aneurysm        Allergies    No Known Allergies    Intolerances        Antibiotics:  MEDICATIONS  (STANDING):  azithromycin  IVPB 500 milliGRAM(s) IV Intermittent every 24 hours  budesonide  80 MICROgram(s)/formoterol 4.5 MICROgram(s) Inhaler 2 Puff(s) Inhalation two times a day  cefTRIAXone   IVPB 1000 milliGRAM(s) IV Intermittent every 24 hours  enoxaparin Injectable 40 milliGRAM(s) SubCutaneous every 24 hours  influenza  Vaccine (HIGH DOSE) 0.7 milliLiter(s) IntraMuscular once  levothyroxine 100 MICROGram(s) Oral daily  metoprolol tartrate 12.5 milliGRAM(s) Oral two times a day  predniSONE   Tablet 50 milliGRAM(s) Oral daily  tiotropium 2.5 MICROgram(s) Inhaler 2 Puff(s) Inhalation daily    MEDICATIONS  (PRN):  acetaminophen     Tablet .. 650 milliGRAM(s) Oral every 6 hours PRN Temp greater or equal to 38C (100.4F), Mild Pain (1 - 3)  albuterol    0.083% 2.5 milliGRAM(s) Nebulizer every 3 hours PRN Shortness of Breath and/or Wheezing  guaiFENesin Oral Liquid (Sugar-Free) 200 milliGRAM(s) Oral every 6 hours PRN Cough       REVIEW OF SYSTEMS:  CONSTITUTIONAL:  No Fever or chills  HEENT:  No diplopia or blurred vision.  No sore throat or runny nose.  CARDIOVASCULAR:  No chest pain or SOB.  RESPIRATORY:  No cough, shortness of breath, PND or orthopnea.  GASTROINTESTINAL:  No nausea, vomiting or diarrhea.  GENITOURINARY:  No dysuria, frequency or urgency. No Blood in urine  MUSCULOSKELETAL:  no joint aches, no muscle pain  SKIN:  No change in skin, hair or nails.  NEUROLOGIC:  No paresthesias, fasciculations, seizures or weakness.  PSYCHIATRIC:  No disorder of thought or mood.  ENDOCRINE:  No heat or cold intolerance, polyuria or polydipsia.  HEMATOLOGICAL:  No easy bruising or bleeding.     Physical Exam:  Vital Signs Last 24 Hrs  T(C): 36.6 (29 Dec 2022 12:56), Max: 36.6 (29 Dec 2022 01:55)  T(F): 97.8 (29 Dec 2022 12:56), Max: 97.8 (29 Dec 2022 01:55)  HR: 86 (29 Dec 2022 12:56) (70 - 94)  BP: 151/73 (29 Dec 2022 12:56) (151/73 - 164/71)  BP(mean): --  RR: 20 (29 Dec 2022 12:56) (17 - 20)  SpO2: 96% (29 Dec 2022 12:56) (94% - 98%)    Parameters below as of 29 Dec 2022 12:56  Patient On (Oxygen Delivery Method): nasal cannula  O2 Flow (L/min): 4    Height (cm): 162.6 ( @ 18:25)  Weight (kg): 95.3 ( @ 18:25)  BMI (kg/m2): 36 ( @ 18:25)  BSA (m2): 2 ( @ 18:25)  GEN: NAD  HEENT: normocephalic and atraumatic. EOMI. PERRL.    NECK: Supple.  No lymphadenopathy   LUNGS: Clear to auscultation.  HEART: Regular rate and rhythm without murmur.  ABDOMEN: Soft, nontender, and nondistended.  Positive bowel sounds.    : No CVA tenderness  EXTREMITIES: Without any cyanosis, clubbing, rash, lesions or edema.  NEUROLOGIC: grossly intact.  PSYCHIATRIC: Appropriate affect .  SKIN: No ulceration or induration present.    Labs:      141  |  106  |  13  ----------------------------<  188<H>  4.1   |  27  |  0.96    Ca    8.9      29 Dec 2022 06:41  Mg     2.3         TPro  8.0  /  Alb  3.6  /  TBili  0.4  /  DBili  x   /  AST  32  /  ALT  48  /  AlkPhos  70                            14.4   9.15  )-----------( 348      ( 29 Dec 2022 06:41 )             45.8     PT/INR - ( 28 Dec 2022 20:04 )   PT: 14.3 sec;   INR: 1.22 ratio         PTT - ( 28 Dec 2022 20:04 )  PTT:30.8 sec    LIVER FUNCTIONS - ( 29 Dec 2022 06:41 )  Alb: 3.6 g/dL / Pro: 8.0 g/dL / ALK PHOS: 70 U/L / ALT: 48 U/L / AST: 32 U/L / GGT: x           CARDIAC MARKERS ( 28 Dec 2022 20:04 )  x     / x     / x     / x     / 1.2 ng/mL        Procalcitonin, Serum: 0.06 ng/mL (22 @ 09:45)        SARS-CoV-2 Result: NotDetec (22 @ 23:23)      RECENT CULTURES:        All imaging and other data have been reviewed.      Assessment and Plan:       Thank you for courtesy of this consult.     Will follow.  Discussed with the primary team.     Miranda Darnell MD  Division of Infectious Diseases   Cell [phone] between 8am and 6pm   After 6pm and weekends please call ID service at [phone].  [facility]  INFECTIOUS DISEASES - Koko Blakely, [place]  [address] [place] [address], [place] [address]  Tel: [phone]     Fax: [phone]  =======================================================    [id number]  CLIFTON LEY     CC: Patient is a 67y old  Female who presents with a chief complaint of Acute hypoxic respiratory failure 2/2 PNA and COPD exacerbation (29 Dec 2022 08:36)    HPI:  67 year old female PMHx HTN, COPD, hypothyroidism, who presented with worsening shortness of breath. Pt reports she developed fever/chills, productive cough with white phlegm, myalgias, shortness of breath on . She presented to urgent care on Fri and had a CXR which revealed RLL PNA. She was started on Levaquin 750mg qd x7 days, of which she completed 6 days. She says she has been taking albuterol 4-6x a day. Prior to this also took a few days of amoxicillin for respiratory infection,. Feels better today. Denies any fevers, chills, pain, nausea or vomiting. Says  also recently sick with respiratory symptoms. Denies any recent travel.      PAST MEDICAL & SURGICAL HISTORY:  Hypothyroidism      COPD (chronic obstructive pulmonary disease)      Hypertension      Arm fracture  Left Arm      Foot injury  Right Foot spur repair.      S/P cataract extraction        S/P cholecystectomy          Social Hx:     FAMILY HISTORY:  Hypertension (Mother)  Mother Alive Age 88    Family history of stroke (Father)  Father -  age 78  Also h/o Coronary artery disease    Family history of cervical cancer (Sibling)  Sister -  age 61    Family history of coronary artery disease (Sibling)  Brother - Alive age 63 - s/p Triple Bypass followed by Aneurysm        Allergies    No Known Allergies    Intolerances        Antibiotics:  MEDICATIONS  (STANDING):  azithromycin  IVPB 500 milliGRAM(s) IV Intermittent every 24 hours  budesonide  80 MICROgram(s)/formoterol 4.5 MICROgram(s) Inhaler 2 Puff(s) Inhalation two times a day  cefTRIAXone   IVPB 1000 milliGRAM(s) IV Intermittent every 24 hours  enoxaparin Injectable 40 milliGRAM(s) SubCutaneous every 24 hours  influenza  Vaccine (HIGH DOSE) 0.7 milliLiter(s) IntraMuscular once  levothyroxine 100 MICROGram(s) Oral daily  metoprolol tartrate 12.5 milliGRAM(s) Oral two times a day  predniSONE   Tablet 50 milliGRAM(s) Oral daily  tiotropium 2.5 MICROgram(s) Inhaler 2 Puff(s) Inhalation daily    MEDICATIONS  (PRN):  acetaminophen     Tablet .. 650 milliGRAM(s) Oral every 6 hours PRN Temp greater or equal to 38C (100.4F), Mild Pain (1 - 3)  albuterol    0.083% 2.5 milliGRAM(s) Nebulizer every 3 hours PRN Shortness of Breath and/or Wheezing  guaiFENesin Oral Liquid (Sugar-Free) 200 milliGRAM(s) Oral every 6 hours PRN Cough       REVIEW OF SYSTEMS:  CONSTITUTIONAL:  No Fever or chills  HEENT:  No sore throat or runny nose.  CARDIOVASCULAR:  No chest pain   RESPIRATORY:  see history  GASTROINTESTINAL:  No nausea, vomiting or diarrhea. no abdominal pain  GENITOURINARY:  No dysuria, frequency or urgency.   MUSCULOSKELETAL:  no back pain  NEUROLOGIC:  No headache, no dizziness  PSYCHIATRIC:  No disorder of thought or mood.    Physical Exam:  Vital Signs Last 24 Hrs  T(C): 36.6 (29 Dec 2022 12:56), Max: 36.6 (29 Dec 2022 01:55)  T(F): 97.8 (29 Dec 2022 12:56), Max: 97.8 (29 Dec 2022 01:55)  HR: 86 (29 Dec 2022 12:56) (70 - 94)  BP: 151/73 (29 Dec 2022 12:56) (151/73 - 164/71)  BP(mean): --  RR: 20 (29 Dec 2022 12:56) (17 - 20)  SpO2: 96% (29 Dec 2022 12:56) (94% - 98%)    Parameters below as of 29 Dec 2022 12:56  Patient On (Oxygen Delivery Method): nasal cannula  O2 Flow (L/min): 4    Height (cm): 162.6 ( @ 18:25)  Weight (kg): 95.3 ( @ 18:25)  BMI (kg/m2): 36 ( @ 18:25)  BSA (m2): 2 ( @ 18:25)  GEN: NAD  HEENT: normocephalic and atraumatic.  NECK: Supple.    LUNGS: No wheezes or rhonchi  HEART: Regular rate and rhythm   ABDOMEN: Soft, nontender, and nondistended.    EXTREMITIES: No leg edema.  NEUROLOGIC: grossly intact.  PSYCHIATRIC: Appropriate affect .      Labs:      141  |  106  |  13  ----------------------------<  188<H>  4.1   |  27  |  0.96    Ca    8.9      29 Dec 2022 06:41  Mg     2.3         TPro  8.0  /  Alb  3.6  /  TBili  0.4  /  DBili  x   /  AST  32  /  ALT  48  /  AlkPhos  70                            14.4   9.15  )-----------( 348      ( 29 Dec 2022 06:41 )             45.8     PT/INR - ( 28 Dec 2022 20:04 )   PT: 14.3 sec;   INR: 1.22 ratio         PTT - ( 28 Dec 2022 20:04 )  PTT:30.8 sec    LIVER FUNCTIONS - ( 29 Dec 2022 06:41 )  Alb: 3.6 g/dL / Pro: 8.0 g/dL / ALK PHOS: 70 U/L / ALT: 48 U/L / AST: 32 U/L / GGT: x           CARDIAC MARKERS ( 28 Dec 2022 20:04 )  x     / x     / x     / x     / 1.2 ng/mL        Procalcitonin, Serum: 0.06 ng/mL (22 @ 09:45)        SARS-CoV-2 Result: NotDetec (22 @ 23:23)      RECENT CULTURES:        All imaging and other data have been reviewed.    CT chest:  FINDINGS:  Respiratory motion artifact degrades image quality.    Fibrosis/scarring right middle and left lingular lobes.  Focal atelectasis or scarring anterior aspect bilateral lower lobes.    There are scattered small groundglass opacities in both upper and lower   lung zones.  No lobar lung consolidation, pleural effusion or pneumothorax.    The central airways remain patent.    LUNGS AND AIRWAYS: PLEURA:  Emphysematous disease.    MEDIASTINUM AND KATELYN:  1 cm short access AP window lymph node.  Subcentimeter short axis pretracheal lymph nodes.    VESSELS:  Atherosclerotic changes thoracic aorta.    HEART: Heart size is normal. No pericardial effusion.    CHEST WALL AND LOWER NECK: Within normal limits.    VISUALIZED UPPER ABDOMEN:  Cholecystectomy.    BONES: Degenerative changes.    IMPRESSION:    Emphysematous disease.    Fibrosis right middle and lingular lobes as discussed.    Few, scattered groundglass opacities, nonspecific, could reflect sequelae   of infection/inflammation.    Other findings as discussed above.

## 2022-12-29 NOTE — CONSULT NOTE ADULT - ASSESSMENT
67 year old female PMHx HTN, COPD, hypothyroidism, who presented with worsening shortness of breath. Recently diagnosed with RLL pneumonia and took 6 days of Levaquin. CT showed scattered small groundglass opacities in both upper and lower lung zones. Symptoms could be related to COPD vs possible viral infection, but would complete course of antibiotics for recent pneumonia. She has no fever or leukocytosis. Serum procalcitonin is very low. Urine legionella antigen is negative.    -continue ceftriaxone until tomorrow to complete full course of antibiotics  -can discontinue azithromycin  -follow blood cultures  -suggest RVP    Thank you for courtesy of this consult.     Will follow.  Discussed with the primary team.     Miranda Darnell MD  Division of Infectious Diseases   Cell [phone] between 8am and 6pm   After 6pm and weekends please call ID service at [phone].

## 2022-12-29 NOTE — PROGRESS NOTE ADULT - PROBLEM SELECTOR PLAN 1
Pt presents with SOB, wheezing due to Acute hypoxic respiratory failure 2/2 PNA and COPD exacerbation  - O2 sat in the 80s on RA. On 4L via NC   - RLL infiltrates on CXR   - Given IV Zithromax 500mg x1, IV Rocephin 1x1, IV Solumedrol 125mg x1, IV NS 1L x1, Duoneb x2 in ED  - Continue IV Zithromax and IV Rocephin   - Duonebs q6h   - Negative  RVP  - Follow up blood cultures, legionella urine antigen, strep pneumo urine antigen, MRSA PCR  - Tylenol 650 mg PO q6h PRN mild pain and/or fever  - Albuterol PRN SOB and/or wheezing  - Supplemental O2 PRN  - Monitor fever and WBC curve  - ID (Dr. Blakely) consulted, f/u recs Pt presents with SOB, wheezing due to Acute hypoxic respiratory failure 2/2 PNA and COPD exacerbation  - O2 sat in the 80s on RA. On 4L via NC   - RLL infiltrates on CXR   - Given IV Zithromax 500mg x1, IV Rocephin 1x1, IV Solumedrol 125mg x1, IV NS 1L x1, Duoneb x2 in ED  - Continue IV Zithromax and IV Rocephin   - Duonebs q6h   - F/u RVP  - Follow up blood cultures, legionella urine antigen, strep pneumo urine antigen, MRSA PCR  - Tylenol 650 mg PO q6h PRN mild pain and/or fever  - Albuterol PRN SOB and/or wheezing  - Supplemental O2 PRN  - Monitor fever and WBC curve  - ID (Dr. Blakely) consulted, f/u recs

## 2022-12-29 NOTE — PHARMACOTHERAPY INTERVENTION NOTE - COMMENTS
Patient is a 68 y/o F ordered for azithromycin 500 mg IVPB every 24 hrs and ceftriaxone 1 g every 24 hrs for PNA. Urine legionella antigen came back negative today, spoke with Dr. Blakely and recommended discontinuing azithromycin. MD agreed and order was discontinued.

## 2022-12-29 NOTE — CONSULT NOTE ADULT - REASON FOR ADMISSION
Acute hypoxic respiratory failure 2/2 PNA and COPD exacerbation
Acute hypoxic respiratory failure 2/2 PNA and COPD exacerbation

## 2022-12-29 NOTE — CONSULT NOTE ADULT - SUBJECTIVE AND OBJECTIVE BOX
Date/Time Patient Seen:  		  Referring MD:   Data Reviewed	       Patient is a 67y old  Female who presents with a chief complaint of Acute hypoxic respiratory failure 2/2 PNA and COPD exacerbation (28 Dec 2022 23:43)      Subjective/HPI    History of Present Illness:  Reason for Admission: Acute hypoxic respiratory failure 2/2 PNA and COPD exacerbation  History of Present Illness:   67 year old female PMHx HTN, mild COPD, hypothyroidism presents to ED c/o shortness of breath. Pt reports she developed fever/chills, productive cough with white phlegm, myalgias, shortness of breath on Fri 12/23. She presented to urgent care on Fri and had a CXR which revealed RLL PNA. She was started on Levaquin 750mg qd x7 days. Pt has been taking the abx as prescribed with little improvement. She admits her shortness of breath has been worsening which prompted her to present to the ED. Pt admits she has been feeling anxious and using PRN Albuterol 4-6x daily. She denies fevers/chills, headaches, sore throat, chest pain, abdominal pain, nausea/vomiting.   PAST MEDICAL & SURGICAL HISTORY:  Hyperlipidemia    Hypothyroidism    COPD (chronic obstructive pulmonary disease)    Hypertension    Diabetes  pre- diabetes    Cholecystitis    Arm fracture  Left Arm    Foot injury  Right Foot spur repair.    S/P cataract extraction  2013    S/P cholecystectomy    FAMILY HISTORY:  Father  Still living? No  Family history of stroke, Age at diagnosis: Age Unknown    Mother  Still living? Yes, Estimated age: 81-90  Hypertension, Age at diagnosis: Age Unknown    Sibling  Still living? Yes, Estimated age: 61-70  Family history of cervical cancer, Age at diagnosis: Age Unknown  Family history of coronary artery disease, Age at diagnosis: Age Unknown.     Social History:  · Substance use	No  · Social History (marital status, living situation, occupation, and sexual history)	Tobacco: quit smoking 15 years ago  EtOH: denies  Recreational drug use: denies  Lives with: fiance at home  Ambulates: independent  ADLs: independent  Vaccinations: declines flu vaccine, COVID J&J     Tobacco Screening:  · Core Measure Site	Yes  · Has the patient used tobacco in the past 30 days?	No    Risk Assessment:    Present on Admission:  Deep Venous Thrombosis	no  Pulmonary Embolus	no     HIV Screening:  · In accordance with [place] State law, we offer every patient who comes to our ED an HIV test. Would you like to be tested today?	Opt out        Medication list         MEDICATIONS  (STANDING):  albuterol/ipratropium for Nebulization 3 milliLiter(s) Nebulizer every 6 hours  azithromycin  IVPB 500 milliGRAM(s) IV Intermittent every 24 hours  cefTRIAXone   IVPB 1000 milliGRAM(s) IV Intermittent every 24 hours  enoxaparin Injectable 40 milliGRAM(s) SubCutaneous every 24 hours  influenza  Vaccine (HIGH DOSE) 0.7 milliLiter(s) IntraMuscular once  levothyroxine 100 MICROGram(s) Oral daily  methylPREDNISolone sodium succinate Injectable 40 milliGRAM(s) IV Push every 8 hours  metoprolol tartrate 12.5 milliGRAM(s) Oral two times a day    MEDICATIONS  (PRN):  acetaminophen     Tablet .. 650 milliGRAM(s) Oral every 6 hours PRN Temp greater or equal to 38C (100.4F), Mild Pain (1 - 3)  albuterol    90 MICROgram(s) HFA Inhaler 2 Puff(s) Inhalation every 6 hours PRN Shortness of Breath and/or Wheezing         Vitals log        ICU Vital Signs Last 24 Hrs  T(C): 36.4 (29 Dec 2022 03:18), Max: 36.6 (29 Dec 2022 01:55)  T(F): 97.6 (29 Dec 2022 03:18), Max: 97.8 (29 Dec 2022 01:55)  HR: 94 (29 Dec 2022 03:18) (70 - 94)  BP: 164/71 (29 Dec 2022 03:18) (153/69 - 164/71)  BP(mean): --  ABP: --  ABP(mean): --  RR: 19 (29 Dec 2022 03:18) (17 - 20)  SpO2: 94% (29 Dec 2022 03:18) (94% - 94%)    O2 Parameters below as of 29 Dec 2022 03:18  Patient On (Oxygen Delivery Method): nasal cannula  O2 Flow (L/min): 4               Input and Output:  I&O's Detail      Lab Data                        15.2   10.21 )-----------( 346      ( 28 Dec 2022 20:04 )             47.8     12-28    141  |  103  |  12  ----------------------------<  106<H>  4.2   |  31  |  0.77    Ca    9.2      28 Dec 2022 20:04  Mg     2.3     12-28    TPro  8.1  /  Alb  3.8  /  TBili  0.7  /  DBili  x   /  AST  40<H>  /  ALT  50  /  AlkPhos  76  12-28      CARDIAC MARKERS ( 28 Dec 2022 20:04 )  x     / x     / x     / x     / 1.2 ng/mL        Review of Systems	  cough  sob  rojas  weakness    Objective     Physical Examination        Pertinent Lab findings & Imaging      Sel[place]:  NO   Adequate UO     I&O's Detail           Discussed with:     Cultures:	        Radiology                             Date/Time Patient Seen:  		  Referring MD:   Data Reviewed	       Patient is a 67y old  Female who presents with a chief complaint of Acute hypoxic respiratory failure 2/2 PNA and COPD exacerbation (28 Dec 2022 23:43)      Subjective/HPI  seen and examined  vs noted  labs reviewed  imaging reviewed  h and p reviewed  alert  verbal  oriented  on o2 support      History of Present Illness:  Reason for Admission: Acute hypoxic respiratory failure 2/2 PNA and COPD exacerbation  History of Present Illness:   67 year old female PMHx HTN, mild COPD, hypothyroidism presents to ED c/o shortness of breath. Pt reports she developed fever/chills, productive cough with white phlegm, myalgias, shortness of breath on Fri 12/23. She presented to urgent care on Fri and had a CXR which revealed RLL PNA. She was started on Levaquin 750mg qd x7 days. Pt has been taking the abx as prescribed with little improvement. She admits her shortness of breath has been worsening which prompted her to present to the ED. Pt admits she has been feeling anxious and using PRN Albuterol 4-6x daily. She denies fevers/chills, headaches, sore throat, chest pain, abdominal pain, nausea/vomiting.   PAST MEDICAL & SURGICAL HISTORY:  Hyperlipidemia    Hypothyroidism    COPD (chronic obstructive pulmonary disease)    Hypertension    Diabetes  pre- diabetes    Cholecystitis    Arm fracture  Left Arm    Foot injury  Right Foot spur repair.    S/P cataract extraction  2013    S/P cholecystectomy    FAMILY HISTORY:  Father  Still living? No  Family history of stroke, Age at diagnosis: Age Unknown    Mother  Still living? Yes, Estimated age: 81-90  Hypertension, Age at diagnosis: Age Unknown    Sibling  Still living? Yes, Estimated age: 61-70  Family history of cervical cancer, Age at diagnosis: Age Unknown  Family history of coronary artery disease, Age at diagnosis: Age Unknown.     Social History:  · Substance use	No  · Social History (marital status, living situation, occupation, and sexual history)	Tobacco: quit smoking 15 years ago  EtOH: denies  Recreational drug use: denies  Lives with: fiance at home  Ambulates: independent  ADLs: independent  Vaccinations: declines flu vaccine, COVID J&J     Tobacco Screening:  · Core Measure Site	Yes  · Has the patient used tobacco in the past 30 days?	No    Risk Assessment:    Present on Admission:  Deep Venous Thrombosis	no  Pulmonary Embolus	no     HIV Screening:  · In accordance with [place] State law, we offer every patient who comes to our ED an HIV test. Would you like to be tested today?	Opt out        Medication list         MEDICATIONS  (STANDING):  albuterol/ipratropium for Nebulization 3 milliLiter(s) Nebulizer every 6 hours  azithromycin  IVPB 500 milliGRAM(s) IV Intermittent every 24 hours  cefTRIAXone   IVPB 1000 milliGRAM(s) IV Intermittent every 24 hours  enoxaparin Injectable 40 milliGRAM(s) SubCutaneous every 24 hours  influenza  Vaccine (HIGH DOSE) 0.7 milliLiter(s) IntraMuscular once  levothyroxine 100 MICROGram(s) Oral daily  methylPREDNISolone sodium succinate Injectable 40 milliGRAM(s) IV Push every 8 hours  metoprolol tartrate 12.5 milliGRAM(s) Oral two times a day    MEDICATIONS  (PRN):  acetaminophen     Tablet .. 650 milliGRAM(s) Oral every 6 hours PRN Temp greater or equal to 38C (100.4F), Mild Pain (1 - 3)  albuterol    90 MICROgram(s) HFA Inhaler 2 Puff(s) Inhalation every 6 hours PRN Shortness of Breath and/or Wheezing         Vitals log        ICU Vital Signs Last 24 Hrs  T(C): 36.4 (29 Dec 2022 03:18), Max: 36.6 (29 Dec 2022 01:55)  T(F): 97.6 (29 Dec 2022 03:18), Max: 97.8 (29 Dec 2022 01:55)  HR: 94 (29 Dec 2022 03:18) (70 - 94)  BP: 164/71 (29 Dec 2022 03:18) (153/69 - 164/71)  BP(mean): --  ABP: --  ABP(mean): --  RR: 19 (29 Dec 2022 03:18) (17 - 20)  SpO2: 94% (29 Dec 2022 03:18) (94% - 94%)    O2 Parameters below as of 29 Dec 2022 03:18  Patient On (Oxygen Delivery Method): nasal cannula  O2 Flow (L/min): 4               Input and Output:  I&O's Detail      Lab Data                        15.2   10.21 )-----------( 346      ( 28 Dec 2022 20:04 )             47.8     12-28    141  |  103  |  12  ----------------------------<  106<H>  4.2   |  31  |  0.77    Ca    9.2      28 Dec 2022 20:04  Mg     2.3     12-28    TPro  8.1  /  Alb  3.8  /  TBili  0.7  /  DBili  x   /  AST  40<H>  /  ALT  50  /  AlkPhos  76  12-28      CARDIAC MARKERS ( 28 Dec 2022 20:04 )  x     / x     / x     / x     / 1.2 ng/mL        Review of Systems	  cough  sob  rojas  weakness    Objective     Physical Examination    heart s1s2  lung dec BS  head nc  obese  cn grossly int  moves extr  alert      Pertinent Lab findings & Imaging      [place]:  NO   Adequate UO     I&O's Detail           Discussed with:     Cultures:	        Radiology    cxr noted

## 2022-12-29 NOTE — CONSULT NOTE ADULT - ASSESSMENT
67 year old female PMHx HTN, mild COPD, hypothyroidism presents to ED c/o shortness of breath. Pt reports she developed fever/chills, productive cough with white phlegm, myalgias, shortness of breath 67 year old female PMHx HTN, mild COPD, hypothyroidism presents to ED c/o shortness of breath. Pt reports she developed fever/chills, productive cough with white phlegm, myalgias, shortness of breath    dm  copd  pna  obesity  suspect BRITTNEY  thyroid disease  malaise    cxr noted  will check CT chest  biomarkers  emp ABX for poss CAP  prednisone - spiriva - symbicort - Albuterol NEBS for copd ex  VBG  monitor VS and Sat  keep sat > 88 pct  anxiolytics  robitussin prn  reassurance - emotional support

## 2022-12-29 NOTE — PATIENT PROFILE ADULT - FALL HARM RISK - HARM RISK INTERVENTIONS

## 2022-12-30 LAB
ANION GAP SERPL CALC-SCNC: 6 MMOL/L — SIGNIFICANT CHANGE UP (ref 5–17)
BUN SERPL-MCNC: 19 MG/DL — SIGNIFICANT CHANGE UP (ref 7–23)
CALCIUM SERPL-MCNC: 8.7 MG/DL — SIGNIFICANT CHANGE UP (ref 8.5–10.1)
CHLORIDE SERPL-SCNC: 106 MMOL/L — SIGNIFICANT CHANGE UP (ref 96–108)
CO2 SERPL-SCNC: 32 MMOL/L — HIGH (ref 22–31)
CREAT SERPL-MCNC: 0.76 MG/DL — SIGNIFICANT CHANGE UP (ref 0.5–1.3)
EGFR: 85 ML/MIN/1.73M2 — SIGNIFICANT CHANGE UP
GLUCOSE SERPL-MCNC: 99 MG/DL — SIGNIFICANT CHANGE UP (ref 70–99)
HCT VFR BLD CALC: 45.2 % — HIGH (ref 34.5–45)
HGB BLD-MCNC: 14 G/DL — SIGNIFICANT CHANGE UP (ref 11.5–15.5)
MAGNESIUM SERPL-MCNC: 2.6 MG/DL — SIGNIFICANT CHANGE UP (ref 1.6–2.6)
MCHC RBC-ENTMCNC: 29 PG — SIGNIFICANT CHANGE UP (ref 27–34)
MCHC RBC-ENTMCNC: 31 GM/DL — LOW (ref 32–36)
MCV RBC AUTO: 93.8 FL — SIGNIFICANT CHANGE UP (ref 80–100)
MRSA PCR RESULT.: SIGNIFICANT CHANGE UP
NRBC # BLD: 0 /100 WBCS — SIGNIFICANT CHANGE UP (ref 0–0)
PHOSPHATE SERPL-MCNC: 3.5 MG/DL — SIGNIFICANT CHANGE UP (ref 2.5–4.5)
PLATELET # BLD AUTO: 327 K/UL — SIGNIFICANT CHANGE UP (ref 150–400)
POTASSIUM SERPL-MCNC: 4.1 MMOL/L — SIGNIFICANT CHANGE UP (ref 3.5–5.3)
POTASSIUM SERPL-SCNC: 4.1 MMOL/L — SIGNIFICANT CHANGE UP (ref 3.5–5.3)
RBC # BLD: 4.82 M/UL — SIGNIFICANT CHANGE UP (ref 3.8–5.2)
RBC # FLD: 13.7 % — SIGNIFICANT CHANGE UP (ref 10.3–14.5)
S AUREUS DNA NOSE QL NAA+PROBE: SIGNIFICANT CHANGE UP
SODIUM SERPL-SCNC: 144 MMOL/L — SIGNIFICANT CHANGE UP (ref 135–145)
WBC # BLD: 14.11 K/UL — HIGH (ref 3.8–10.5)
WBC # FLD AUTO: 14.11 K/UL — HIGH (ref 3.8–10.5)

## 2022-12-30 PROCEDURE — 99233 SBSQ HOSP IP/OBS HIGH 50: CPT

## 2022-12-30 RX ADMIN — Medication 650 MILLIGRAM(S): at 15:00

## 2022-12-30 RX ADMIN — Medication 50 MILLIGRAM(S): at 06:05

## 2022-12-30 RX ADMIN — Medication 12.5 MILLIGRAM(S): at 06:05

## 2022-12-30 RX ADMIN — Medication 12.5 MILLIGRAM(S): at 17:46

## 2022-12-30 RX ADMIN — BUDESONIDE AND FORMOTEROL FUMARATE DIHYDRATE 2 PUFF(S): 160; 4.5 AEROSOL RESPIRATORY (INHALATION) at 21:38

## 2022-12-30 RX ADMIN — CEFTRIAXONE 100 MILLIGRAM(S): 500 INJECTION, POWDER, FOR SOLUTION INTRAMUSCULAR; INTRAVENOUS at 21:38

## 2022-12-30 RX ADMIN — Medication 650 MILLIGRAM(S): at 14:08

## 2022-12-30 RX ADMIN — Medication 100 MICROGRAM(S): at 06:05

## 2022-12-30 RX ADMIN — TIOTROPIUM BROMIDE 2 PUFF(S): 18 CAPSULE ORAL; RESPIRATORY (INHALATION) at 12:09

## 2022-12-30 RX ADMIN — BUDESONIDE AND FORMOTEROL FUMARATE DIHYDRATE 2 PUFF(S): 160; 4.5 AEROSOL RESPIRATORY (INHALATION) at 12:08

## 2022-12-30 NOTE — PROGRESS NOTE ADULT - PROBLEM SELECTOR PLAN 1
Pt presents with SOB, wheezing due to Acute hypoxic respiratory failure 2/2 PNA and COPD exacerbation  - O2 sat in the 80s on RA. On 4L via NC   - RLL infiltrates on CXR   - CT chest result reviewed. Outpatient f/u with Pulm   - Completed course of antibiotics today   - Duonebs q6h as needed   - Negative  RVP, Flu, COVID   - Negative  blood cultures, legionella urine antigen, strep pneumo urine antigen, MRSA PCR  - Tylenol 650 mg PO q6h PRN mild pain and/or fever  - Albuterol PRN SOB and/or wheezing  - Check O2 sat on room air.   - Monitor fever and WBC curve  - ID (Dr. Blakely) and Pulm Dr. Altman

## 2022-12-31 ENCOUNTER — TRANSCRIPTION ENCOUNTER (OUTPATIENT)
Age: 67
End: 2022-12-31

## 2022-12-31 VITALS
OXYGEN SATURATION: 94 % | DIASTOLIC BLOOD PRESSURE: 82 MMHG | TEMPERATURE: 99 F | SYSTOLIC BLOOD PRESSURE: 149 MMHG | RESPIRATION RATE: 17 BRPM | HEART RATE: 78 BPM

## 2022-12-31 LAB
ANION GAP SERPL CALC-SCNC: 8 MMOL/L — SIGNIFICANT CHANGE UP (ref 5–17)
BASE EXCESS BLDV CALC-SCNC: 12.2 MMOL/L — HIGH (ref -2–3)
BLOOD GAS COMMENTS, VENOUS: SIGNIFICANT CHANGE UP
BUN SERPL-MCNC: 3 MG/DL — LOW (ref 7–23)
CALCIUM SERPL-MCNC: 9.4 MG/DL — SIGNIFICANT CHANGE UP (ref 8.5–10.1)
CHLORIDE SERPL-SCNC: 106 MMOL/L — SIGNIFICANT CHANGE UP (ref 96–108)
CO2 SERPL-SCNC: 27 MMOL/L — SIGNIFICANT CHANGE UP (ref 22–31)
CREAT SERPL-MCNC: 0.71 MG/DL — SIGNIFICANT CHANGE UP (ref 0.5–1.3)
EGFR: 92 ML/MIN/1.73M2 — SIGNIFICANT CHANGE UP
GAS PNL BLDV: SIGNIFICANT CHANGE UP
GLUCOSE SERPL-MCNC: 98 MG/DL — SIGNIFICANT CHANGE UP (ref 70–99)
GRAM STN FLD: SIGNIFICANT CHANGE UP
HCO3 BLDV-SCNC: 36 MMOL/L — HIGH (ref 22–29)
HCT VFR BLD CALC: 42.4 % — SIGNIFICANT CHANGE UP (ref 34.5–45)
HGB BLD-MCNC: 14.2 G/DL — SIGNIFICANT CHANGE UP (ref 11.5–15.5)
MCHC RBC-ENTMCNC: 30.8 PG — SIGNIFICANT CHANGE UP (ref 27–34)
MCHC RBC-ENTMCNC: 33.5 GM/DL — SIGNIFICANT CHANGE UP (ref 32–36)
MCV RBC AUTO: 92 FL — SIGNIFICANT CHANGE UP (ref 80–100)
MRSA PCR RESULT.: SIGNIFICANT CHANGE UP
NRBC # BLD: 0 /100 WBCS — SIGNIFICANT CHANGE UP (ref 0–0)
PCO2 BLDV: 55 MMHG — HIGH (ref 39–42)
PH BLDV: 7.43 — SIGNIFICANT CHANGE UP (ref 7.32–7.43)
PLATELET # BLD AUTO: 229 K/UL — SIGNIFICANT CHANGE UP (ref 150–400)
PO2 BLDV: 73 MMHG — HIGH (ref 25–45)
POTASSIUM SERPL-MCNC: 3.8 MMOL/L — SIGNIFICANT CHANGE UP (ref 3.5–5.3)
POTASSIUM SERPL-SCNC: 3.8 MMOL/L — SIGNIFICANT CHANGE UP (ref 3.5–5.3)
RBC # BLD: 4.61 M/UL — SIGNIFICANT CHANGE UP (ref 3.8–5.2)
RBC # FLD: 12.8 % — SIGNIFICANT CHANGE UP (ref 10.3–14.5)
S AUREUS DNA NOSE QL NAA+PROBE: SIGNIFICANT CHANGE UP
SAO2 % BLDV: 95.6 % — HIGH (ref 67–88)
SODIUM SERPL-SCNC: 141 MMOL/L — SIGNIFICANT CHANGE UP (ref 135–145)
SPECIMEN SOURCE: SIGNIFICANT CHANGE UP
WBC # BLD: 3.67 K/UL — LOW (ref 3.8–10.5)
WBC # FLD AUTO: 3.67 K/UL — LOW (ref 3.8–10.5)

## 2022-12-31 PROCEDURE — 80053 COMPREHEN METABOLIC PANEL: CPT

## 2022-12-31 PROCEDURE — 87637 SARSCOV2&INF A&B&RSV AMP PRB: CPT

## 2022-12-31 PROCEDURE — 99239 HOSP IP/OBS DSCHRG MGMT >30: CPT

## 2022-12-31 PROCEDURE — 84145 PROCALCITONIN (PCT): CPT

## 2022-12-31 PROCEDURE — 71250 CT THORAX DX C-: CPT

## 2022-12-31 PROCEDURE — 71045 X-RAY EXAM CHEST 1 VIEW: CPT

## 2022-12-31 PROCEDURE — 82803 BLOOD GASES ANY COMBINATION: CPT

## 2022-12-31 PROCEDURE — 85610 PROTHROMBIN TIME: CPT

## 2022-12-31 PROCEDURE — 93005 ELECTROCARDIOGRAM TRACING: CPT

## 2022-12-31 PROCEDURE — 94640 AIRWAY INHALATION TREATMENT: CPT

## 2022-12-31 PROCEDURE — 87070 CULTURE OTHR SPECIMN AEROBIC: CPT

## 2022-12-31 PROCEDURE — 84443 ASSAY THYROID STIM HORMONE: CPT

## 2022-12-31 PROCEDURE — 87449 NOS EACH ORGANISM AG IA: CPT

## 2022-12-31 PROCEDURE — 80048 BASIC METABOLIC PNL TOTAL CA: CPT

## 2022-12-31 PROCEDURE — 87641 MR-STAPH DNA AMP PROBE: CPT

## 2022-12-31 PROCEDURE — 36415 COLL VENOUS BLD VENIPUNCTURE: CPT

## 2022-12-31 PROCEDURE — 87899 AGENT NOS ASSAY W/OPTIC: CPT

## 2022-12-31 PROCEDURE — 0225U NFCT DS DNA&RNA 21 SARSCOV2: CPT

## 2022-12-31 PROCEDURE — 82553 CREATINE MB FRACTION: CPT

## 2022-12-31 PROCEDURE — 96374 THER/PROPH/DIAG INJ IV PUSH: CPT

## 2022-12-31 PROCEDURE — 85025 COMPLETE CBC W/AUTO DIFF WBC: CPT

## 2022-12-31 PROCEDURE — 86140 C-REACTIVE PROTEIN: CPT

## 2022-12-31 PROCEDURE — 84100 ASSAY OF PHOSPHORUS: CPT

## 2022-12-31 PROCEDURE — 83605 ASSAY OF LACTIC ACID: CPT

## 2022-12-31 PROCEDURE — 99291 CRITICAL CARE FIRST HOUR: CPT

## 2022-12-31 PROCEDURE — 83735 ASSAY OF MAGNESIUM: CPT

## 2022-12-31 PROCEDURE — 83880 ASSAY OF NATRIURETIC PEPTIDE: CPT

## 2022-12-31 PROCEDURE — 85027 COMPLETE CBC AUTOMATED: CPT

## 2022-12-31 PROCEDURE — 84484 ASSAY OF TROPONIN QUANT: CPT

## 2022-12-31 PROCEDURE — 87040 BLOOD CULTURE FOR BACTERIA: CPT

## 2022-12-31 PROCEDURE — 83036 HEMOGLOBIN GLYCOSYLATED A1C: CPT

## 2022-12-31 PROCEDURE — 85730 THROMBOPLASTIN TIME PARTIAL: CPT

## 2022-12-31 PROCEDURE — 87640 STAPH A DNA AMP PROBE: CPT

## 2022-12-31 PROCEDURE — 86803 HEPATITIS C AB TEST: CPT

## 2022-12-31 PROCEDURE — 96375 TX/PRO/DX INJ NEW DRUG ADDON: CPT

## 2022-12-31 RX ORDER — POLYETHYLENE GLYCOL 3350 17 G/17G
17 POWDER, FOR SOLUTION ORAL
Qty: 0 | Refills: 0 | DISCHARGE
Start: 2022-12-31

## 2022-12-31 RX ORDER — LEVOFLOXACIN 5 MG/ML
1 INJECTION, SOLUTION INTRAVENOUS
Qty: 0 | Refills: 0 | DISCHARGE

## 2022-12-31 RX ORDER — BUDESONIDE AND FORMOTEROL FUMARATE DIHYDRATE 160; 4.5 UG/1; UG/1
2 AEROSOL RESPIRATORY (INHALATION)
Qty: 1 | Refills: 0
Start: 2022-12-31

## 2022-12-31 RX ORDER — TIOTROPIUM BROMIDE 18 UG/1
2 CAPSULE ORAL; RESPIRATORY (INHALATION)
Qty: 1 | Refills: 0
Start: 2022-12-31

## 2022-12-31 RX ORDER — ACETAMINOPHEN 500 MG
2 TABLET ORAL
Qty: 0 | Refills: 0 | DISCHARGE
Start: 2022-12-31

## 2022-12-31 RX ORDER — SENNA PLUS 8.6 MG/1
2 TABLET ORAL AT BEDTIME
Refills: 0 | Status: DISCONTINUED | OUTPATIENT
Start: 2022-12-31 | End: 2022-12-31

## 2022-12-31 RX ORDER — POLYETHYLENE GLYCOL 3350 17 G/17G
17 POWDER, FOR SOLUTION ORAL DAILY
Refills: 0 | Status: DISCONTINUED | OUTPATIENT
Start: 2022-12-31 | End: 2022-12-31

## 2022-12-31 RX ADMIN — TIOTROPIUM BROMIDE 2 PUFF(S): 18 CAPSULE ORAL; RESPIRATORY (INHALATION) at 06:36

## 2022-12-31 RX ADMIN — Medication 100 MICROGRAM(S): at 06:35

## 2022-12-31 RX ADMIN — BUDESONIDE AND FORMOTEROL FUMARATE DIHYDRATE 2 PUFF(S): 160; 4.5 AEROSOL RESPIRATORY (INHALATION) at 06:36

## 2022-12-31 RX ADMIN — Medication 50 MILLIGRAM(S): at 06:36

## 2022-12-31 RX ADMIN — Medication 12.5 MILLIGRAM(S): at 06:35

## 2022-12-31 RX ADMIN — ENOXAPARIN SODIUM 40 MILLIGRAM(S): 100 INJECTION SUBCUTANEOUS at 00:47

## 2022-12-31 NOTE — PROGRESS NOTE ADULT - PROBLEM SELECTOR PLAN 2
Acute hypoxic respiratory failure 2/2 PNA and COPD exacerbation  - Pt not on home O2  - S/p Solumedrol 125 mg IVP x1 in ED  - On Prednisone by pulm now. Will taper   - duonebs q6 PRN  -Symbicort   - Pulmonary (Dr. Whaley)   Remainder of plan as above

## 2022-12-31 NOTE — DISCHARGE NOTE NURSING/CASE MANAGEMENT/SOCIAL WORK - NSDCDMENUMBER_GEN_ALL_CORE_FT
[facility]:  I discussed with the patient the risks of golimumab including but not limited to myelosuppression, immunosuppression, autoimmune hepatitis, demyelinating diseases, lymphoma, and serious infections.  The patient understands that monitoring is required including a PPD at baseline and must alert us or the primary physician if symptoms of infection or other concerning signs are noted.  [phone]

## 2022-12-31 NOTE — PROGRESS NOTE ADULT - PROBLEM SELECTOR PLAN 4
- Start Metoprolol 12.5mg bid as therapeutic interchange for home Bystolic (non-formulary)

## 2022-12-31 NOTE — DISCHARGE NOTE NURSING/CASE MANAGEMENT/SOCIAL WORK - PATIENT PORTAL LINK FT
You can access the FollowMyHealth Patient Portal offered by [facility] by registering at the following website: [email or web address]. By joining [facility]’s FollowMyHealth portal, you will also be able to view your health information using other applications (apps) compatible with our system.

## 2022-12-31 NOTE — DISCHARGE NOTE PROVIDER - NSDCCPCAREPLAN_GEN_ALL_CORE_FT
PRINCIPAL DISCHARGE DIAGNOSIS  Diagnosis: Pneumonia  Assessment and Plan of Treatment: Completed course of antibiotics as inpatient as recommended by ID  Continue Inhalers  Complete course of Prednisone as prescribed  Resume home meds  F/u with Pulm.   F/u with PCP in 2-3 days      SECONDARY DISCHARGE DIAGNOSES  Diagnosis: Respiratory distress  Assessment and Plan of Treatment:     Diagnosis: Respiratory failure, unspecified with hypoxia  Assessment and Plan of Treatment:

## 2022-12-31 NOTE — CHART NOTE - NSCHARTNOTEFT_GEN_A_CORE
Patient needs home O2 due to diagnosis of COPD. Oxygen saturation less than 88% on room air at rest.

## 2022-12-31 NOTE — PROGRESS NOTE ADULT - SUBJECTIVE AND OBJECTIVE BOX
Patient is a 67y old  Female who presents with a chief complaint of Acute hypoxic respiratory failure 2/2 PNA and COPD exacerbation (29 Dec 2022 05:08)       INTERVAL HPI/OVERNIGHT EVENTS: Seen and examined at bedside. Feeling better on O2. Denies chest pain, palpitation    MEDICATIONS  (STANDING):  azithromycin  IVPB 500 milliGRAM(s) IV Intermittent every 24 hours  budesonide  80 MICROgram(s)/formoterol 4.5 MICROgram(s) Inhaler 2 Puff(s) Inhalation two times a day  cefTRIAXone   IVPB 1000 milliGRAM(s) IV Intermittent every 24 hours  enoxaparin Injectable 40 milliGRAM(s) SubCutaneous every 24 hours  influenza  Vaccine (HIGH DOSE) 0.7 milliLiter(s) IntraMuscular once  levothyroxine 100 MICROGram(s) Oral daily  metoprolol tartrate 12.5 milliGRAM(s) Oral two times a day  predniSONE   Tablet 50 milliGRAM(s) Oral daily  tiotropium 2.5 MICROgram(s) Inhaler 2 Puff(s) Inhalation daily    MEDICATIONS  (PRN):  acetaminophen     Tablet .. 650 milliGRAM(s) Oral every 6 hours PRN Temp greater or equal to 38C (100.4F), Mild Pain (1 - 3)  albuterol    0.083% 2.5 milliGRAM(s) Nebulizer every 3 hours PRN Shortness of Breath and/or Wheezing  guaiFENesin Oral Liquid (Sugar-Free) 200 milliGRAM(s) Oral every 6 hours PRN Cough      Allergies    No Known Allergies    Intolerances        REVIEW OF SYSTEMS:  CONSTITUTIONAL: No fever, weight loss, or fatigue  EYES: No eye pain, visual disturbances, or discharge  ENMT:  No difficulty hearing, tinnitus, vertigo; No sinus or throat pain  NECK: No pain or stiffness  BREASTS: No pain, masses, or nipple discharge  RESPIRATORY: No cough, wheezing, chills or hemoptysis; + shortness of breath  CARDIOVASCULAR: No chest pain, palpitations, dizziness, or leg swelling  GASTROINTESTINAL: No abdominal or epigastric pain. No nausea, vomiting, or hematemesis; No diarrhea or constipation. No melena or hematochezia.  GENITOURINARY: No dysuria, frequency, hematuria, or incontinence  NEUROLOGICAL: No headaches, memory loss, loss of strength, numbness, or tremors  SKIN: No itching, burning, rashes, or lesions   LYMPH NODES: No enlarged glands  ENDOCRINE: No heat or cold intolerance; No hair loss; No polydipsia or polyuria  MUSCULOSKELETAL: No joint pain or swelling; No muscle, back, or extremity pain  PSYCHIATRIC: No depression, anxiety, mood swings, or difficulty sleeping  HEME/LYMPH: No easy bruising, or bleeding gums  ALLERGY AND IMMUNOLOGIC: No hives or eczema    Vital Signs Last 24 Hrs  T(C): 36.4 (29 Dec 2022 03:18), Max: 36.6 (29 Dec 2022 01:55)  T(F): 97.6 (29 Dec 2022 03:18), Max: 97.8 (29 Dec 2022 01:55)  HR: 77 (29 Dec 2022 07:31) (70 - 94)  BP: 164/71 (29 Dec 2022 03:18) (153/69 - 164/71)  BP(mean): --  RR: 19 (29 Dec 2022 03:18) (17 - 20)  SpO2: 98% (29 Dec 2022 07:31) (94% - 98%)    Parameters below as of 29 Dec 2022 07:31  Patient On (Oxygen Delivery Method): nasal cannula,3 L        PHYSICAL EXAM:  GENERAL: NAD, well-groomed, well-developed  HEAD:  Atraumatic, Normocephalic  EYES: EOMI, PERRLA, conjunctiva and sclera clear  ENMT: No tonsillar erythema, exudates, or enlargement; Moist mucous membranes, Good dentition, No lesions  NECK: Supple, No JVD, Normal thyroid  NERVOUS SYSTEM:  Alert & Oriented X3, Good concentration; Motor Strength 5/5 B/L upper and lower extremities  CHEST/LUNG: Decreased bibasilar breath sounds, no wheezing   HEART: S1S2+, Regular rate and rhythm  ABDOMEN: Soft, Nontender, Nondistended; Bowel sounds present  EXTREMITIES:  2+ Peripheral Pulses, No clubbing, cyanosis, or edema  LYMPH: No lymphadenopathy noted  SKIN: No rashes, warm, dry     LABS:                        14.4   9.15  )-----------( 348      ( 29 Dec 2022 06:41 )             45.8     29 Dec 2022 06:41    141    |  106    |  13     ----------------------------<  188    4.1     |  27     |  0.96     Ca    8.9        29 Dec 2022 06:41  Mg     2.3       28 Dec 2022 20:04    TPro  8.0    /  Alb  3.6    /  TBili  0.4    /  DBili  x      /  AST  32     /  ALT  48     /  AlkPhos  70     29 Dec 2022 06:41    PT/INR - ( 28 Dec 2022 20:04 )   PT: 14.3 sec;   INR: 1.22 ratio         PTT - ( 28 Dec 2022 20:04 )  PTT:30.8 sec  CAPILLARY BLOOD GLUCOSE        BLOOD CULTURE    RADIOLOGY & ADDITIONAL TESTS:    Imaging Personally Reviewed:  [ ] YES     Consultant(s) Notes Reviewed:      Care Discussed with Consultants/Other Providers:
Patient is a 67y old  Female who presents with a chief complaint of Acute hypoxic respiratory failure 2/2 PNA and COPD exacerbation (31 Dec 2022 05:07)       INTERVAL HPI/OVERNIGHT EVENTS: Seen and examined at bedside. Still feels sob intermittently. Denies chest pain, palpitation     MEDICATIONS  (STANDING):  budesonide  80 MICROgram(s)/formoterol 4.5 MICROgram(s) Inhaler 2 Puff(s) Inhalation two times a day  enoxaparin Injectable 40 milliGRAM(s) SubCutaneous every 24 hours  influenza  Vaccine (HIGH DOSE) 0.7 milliLiter(s) IntraMuscular once  levothyroxine 100 MICROGram(s) Oral daily  metoprolol tartrate 12.5 milliGRAM(s) Oral two times a day  predniSONE   Tablet 50 milliGRAM(s) Oral daily  senna 2 Tablet(s) Oral at bedtime  tiotropium 2.5 MICROgram(s) Inhaler 2 Puff(s) Inhalation daily    MEDICATIONS  (PRN):  acetaminophen     Tablet .. 650 milliGRAM(s) Oral every 6 hours PRN Temp greater or equal to 38C (100.4F), Mild Pain (1 - 3)  albuterol    0.083% 2.5 milliGRAM(s) Nebulizer every 3 hours PRN Shortness of Breath and/or Wheezing  guaiFENesin Oral Liquid (Sugar-Free) 200 milliGRAM(s) Oral every 6 hours PRN Cough  polyethylene glycol 3350 17 Gram(s) Oral daily PRN Constipation      Allergies    No Known Allergies    Intolerances        REVIEW OF SYSTEMS:  CONSTITUTIONAL: No fever, weight loss, or fatigue  EYES: No eye pain, visual disturbances, or discharge  ENMT:  No difficulty hearing, tinnitus, vertigo; No sinus or throat pain  NECK: No pain or stiffness  RESPIRATORY: No cough, wheezing, chills or hemoptysis; + shortness of breath  CARDIOVASCULAR: No chest pain, palpitations, dizziness, or leg swelling  GASTROINTESTINAL: No abdominal or epigastric pain. No nausea, vomiting, or hematemesis; No diarrhea or constipation. No melena or hematochezia.  GENITOURINARY: No dysuria, frequency, hematuria, or incontinence  NEUROLOGICAL: No headaches, memory loss, loss of strength, numbness, or tremors  SKIN: No itching, burning, rashes, or lesions   LYMPH NODES: No enlarged glands  ENDOCRINE: No heat or cold intolerance; No hair loss; No polydipsia or polyuria  MUSCULOSKELETAL: No joint pain or swelling; No muscle, back, or extremity pain  PSYCHIATRIC: No depression, anxiety, mood swings, or difficulty sleeping  HEME/LYMPH: No easy bruising, or bleeding gums  ALLERGY AND IMMUNOLOGIC: No hives or eczema  Vital Signs Last 24 Hrs  T(C): 37.2 (31 Dec 2022 06:28), Max: 37.2 (31 Dec 2022 06:28)  T(F): 98.9 (31 Dec 2022 06:28), Max: 98.9 (31 Dec 2022 06:28)  HR: 75 (31 Dec 2022 06:28) (75 - 77)  BP: 161/84 (31 Dec 2022 06:28) (122/66 - 161/84)  BP(mean): --  RR: 18 (31 Dec 2022 06:28) (18 - 19)  SpO2: 96% (31 Dec 2022 06:28) (87% - 96%)    Parameters below as of 31 Dec 2022 06:28  Patient On (Oxygen Delivery Method): nasal cannula        PHYSICAL EXAM:  GENERAL: NAD, well-groomed, well-developed  HEAD:  Atraumatic, Normocephalic  EYES: EOMI, PERRLA, conjunctiva and sclera clear  ENMT: No tonsillar erythema, exudates, or enlargement; Moist mucous membranes, Good dentition, No lesions  NECK: Supple, No JVD, Normal thyroid  NERVOUS SYSTEM:  Alert & Oriented X3, Good concentration; Motor Strength 5/5 B/L upper and lower extremities  CHEST/LUNG: Decreased bibasilar breath sounds, no wheezing   HEART: S1S2+, Regular rate and rhythm  ABDOMEN: Soft, Nontender, Nondistended; Bowel sounds present  EXTREMITIES:  2+ Peripheral Pulses, No clubbing, cyanosis, or edema  LYMPH: No lymphadenopathy noted  SKIN: No rashes, warm, dry     LABS:      Ca    8.7        30 Dec 2022 07:30        CAPILLARY BLOOD GLUCOSE        BLOOD CULTURE  12-28 @ 20:04   No growth to date.  --  --  12-28 @ 19:55   No growth to date.  --  --    RADIOLOGY & ADDITIONAL TESTS:    Imaging Personally Reviewed:  [ ] YES     Consultant(s) Notes Reviewed:      Care Discussed with Consultants/Other Providers:
Date/Time Patient Seen:  		  Referring MD:   Data Reviewed	       Patient is a 67y old  Female who presents with a chief complaint of Acute hypoxic respiratory failure 2/2 PNA and COPD exacerbation (29 Dec 2022 13:39)      Subjective/HPI     PAST MEDICAL & SURGICAL HISTORY:  Hyperlipidemia    Hypothyroidism    COPD (chronic obstructive pulmonary disease)    Hypertension    Diabetes  pre- diabetes    Cholecystitis    Arm fracture  Left Arm    Foot injury  Right Foot spur repair.    S/P cataract extraction  2013    S/P cholecystectomy          Medication list         MEDICATIONS  (STANDING):  budesonide  80 MICROgram(s)/formoterol 4.5 MICROgram(s) Inhaler 2 Puff(s) Inhalation two times a day  cefTRIAXone   IVPB 1000 milliGRAM(s) IV Intermittent every 24 hours  enoxaparin Injectable 40 milliGRAM(s) SubCutaneous every 24 hours  influenza  Vaccine (HIGH DOSE) 0.7 milliLiter(s) IntraMuscular once  levothyroxine 100 MICROGram(s) Oral daily  metoprolol tartrate 12.5 milliGRAM(s) Oral two times a day  predniSONE   Tablet 50 milliGRAM(s) Oral daily  tiotropium 2.5 MICROgram(s) Inhaler 2 Puff(s) Inhalation daily    MEDICATIONS  (PRN):  acetaminophen     Tablet .. 650 milliGRAM(s) Oral every 6 hours PRN Temp greater or equal to 38C (100.4F), Mild Pain (1 - 3)  albuterol    0.083% 2.5 milliGRAM(s) Nebulizer every 3 hours PRN Shortness of Breath and/or Wheezing  guaiFENesin Oral Liquid (Sugar-Free) 200 milliGRAM(s) Oral every 6 hours PRN Cough         Vitals log        ICU Vital Signs Last 24 Hrs  T(C): 37.1 (29 Dec 2022 20:52), Max: 37.1 (29 Dec 2022 20:52)  T(F): 98.8 (29 Dec 2022 20:52), Max: 98.8 (29 Dec 2022 20:52)  HR: 85 (29 Dec 2022 20:52) (77 - 108)  BP: 161/73 (29 Dec 2022 20:52) (145/72 - 161/73)  BP(mean): --  ABP: --  ABP(mean): --  RR: 19 (29 Dec 2022 20:52) (19 - 20)  SpO2: 96% (29 Dec 2022 20:52) (96% - 98%)    O2 Parameters below as of 29 Dec 2022 20:52  Patient On (Oxygen Delivery Method): nasal cannula  O2 Flow (L/min): 4               Input and Output:  I&O's Detail    29 Dec 2022 07:01  -  30 Dec 2022 05:05  --------------------------------------------------------  IN:    IV PiggyBack: 50 mL  Total IN: 50 mL    OUT:  Total OUT: 0 mL    Total NET: 50 mL          Lab Data                        14.4   9.15  )-----------( 348      ( 29 Dec 2022 06:41 )             45.8     12-29    141  |  106  |  13  ----------------------------<  188<H>  4.1   |  27  |  0.96    Ca    8.9      29 Dec 2022 06:41  Mg     2.3     12-28    TPro  8.0  /  Alb  3.6  /  TBili  0.4  /  DBili  x   /  AST  32  /  ALT  48  /  AlkPhos  70  12-29      CARDIAC MARKERS ( 28 Dec 2022 20:04 )  x     / x     / x     / x     / 1.2 ng/mL        Review of Systems	      Objective     Physical Examination    heart 1s2  lung dec BS  head nc  verbal      Pertinent Lab findings & Imaging      Bal:  NO   Adequate UO     I&O's Detail    29 Dec 2022 07:01  -  30 Dec 2022 05:05  --------------------------------------------------------  IN:    IV PiggyBack: 50 mL  Total IN: 50 mL    OUT:  Total OUT: 0 mL    Total NET: 50 mL               Discussed with:     Cultures:	        Radiology                            
Date/Time Patient Seen:  		  Referring MD:   Data Reviewed	       Patient is a 67y old  Female who presents with a chief complaint of Acute hypoxic respiratory failure 2/2 PNA and COPD exacerbation (30 Dec 2022 15:58)      Subjective/HPI     PAST MEDICAL & SURGICAL HISTORY:  Hyperlipidemia    Hypothyroidism    COPD (chronic obstructive pulmonary disease)    Hypertension    Diabetes  pre- diabetes    Cholecystitis    Arm fracture  Left Arm    Foot injury  Right Foot spur repair.    S/P cataract extraction  2013    S/P cholecystectomy          Medication list         MEDICATIONS  (STANDING):  budesonide  80 MICROgram(s)/formoterol 4.5 MICROgram(s) Inhaler 2 Puff(s) Inhalation two times a day  cefTRIAXone   IVPB 1000 milliGRAM(s) IV Intermittent every 24 hours  enoxaparin Injectable 40 milliGRAM(s) SubCutaneous every 24 hours  influenza  Vaccine (HIGH DOSE) 0.7 milliLiter(s) IntraMuscular once  levothyroxine 100 MICROGram(s) Oral daily  metoprolol tartrate 12.5 milliGRAM(s) Oral two times a day  predniSONE   Tablet 50 milliGRAM(s) Oral daily  tiotropium 2.5 MICROgram(s) Inhaler 2 Puff(s) Inhalation daily    MEDICATIONS  (PRN):  acetaminophen     Tablet .. 650 milliGRAM(s) Oral every 6 hours PRN Temp greater or equal to 38C (100.4F), Mild Pain (1 - 3)  albuterol    0.083% 2.5 milliGRAM(s) Nebulizer every 3 hours PRN Shortness of Breath and/or Wheezing  guaiFENesin Oral Liquid (Sugar-Free) 200 milliGRAM(s) Oral every 6 hours PRN Cough         Vitals log        ICU Vital Signs Last 24 Hrs  T(C): 36.8 (30 Dec 2022 20:26), Max: 36.9 (30 Dec 2022 05:15)  T(F): 98.2 (30 Dec 2022 20:26), Max: 98.5 (30 Dec 2022 05:15)  HR: 77 (30 Dec 2022 20:26) (75 - 77)  BP: 122/66 (30 Dec 2022 20:26) (122/66 - 137/73)  BP(mean): --  ABP: --  ABP(mean): --  RR: 19 (30 Dec 2022 20:26) (19 - 19)  SpO2: 95% (30 Dec 2022 20:26) (87% - 95%)    O2 Parameters below as of 30 Dec 2022 20:26  Patient On (Oxygen Delivery Method): nasal cannula                 Input and Output:  I&O's Detail    29 Dec 2022 07:01  -  30 Dec 2022 07:00  --------------------------------------------------------  IN:    IV PiggyBack: 50 mL  Total IN: 50 mL    OUT:  Total OUT: 0 mL    Total NET: 50 mL      30 Dec 2022 07:01  -  31 Dec 2022 05:07  --------------------------------------------------------  IN:    IV PiggyBack: 50 mL  Total IN: 50 mL    OUT:  Total OUT: 0 mL    Total NET: 50 mL          Lab Data                        14.0   14.11 )-----------( 327      ( 30 Dec 2022 07:30 )             45.2     12-30    144  |  106  |  19  ----------------------------<  99  4.1   |  32<H>  |  0.76    Ca    8.7      30 Dec 2022 07:30  Phos  3.5     12-30  Mg     2.6     12-30    TPro  8.0  /  Alb  3.6  /  TBili  0.4  /  DBili  x   /  AST  32  /  ALT  48  /  AlkPhos  70  12-29            Review of Systems	      Objective     Physical Examination    heart s1s2  lung dc BS  head nc      Pertinent Lab findings & Imaging      Bal:  NO   Adequate UO     I&O's Detail    29 Dec 2022 07:01  -  30 Dec 2022 07:00  --------------------------------------------------------  IN:    IV PiggyBack: 50 mL  Total IN: 50 mL    OUT:  Total OUT: 0 mL    Total NET: 50 mL      30 Dec 2022 07:01  -  31 Dec 2022 05:07  --------------------------------------------------------  IN:    IV PiggyBack: 50 mL  Total IN: 50 mL    OUT:  Total OUT: 0 mL    Total NET: 50 mL               Discussed with:     Cultures:	        Radiology                            
[facility]  INFECTIOUS DISEASES - [address], [place] [address]  Tel: [phone]     Fax: [phone]  =======================================================    CLIFTON LEY [id number]    Follow up: No fevers. Feels tired but SOB improved. Still with some cough but denies any pain.    Allergies:  No Known Allergies      Antibiotics:  acetaminophen     Tablet .. 650 milliGRAM(s) Oral every 6 hours PRN  albuterol    0.083% 2.5 milliGRAM(s) Nebulizer every 3 hours PRN  budesonide  80 MICROgram(s)/formoterol 4.5 MICROgram(s) Inhaler 2 Puff(s) Inhalation two times a day  cefTRIAXone   IVPB 1000 milliGRAM(s) IV Intermittent every 24 hours  enoxaparin Injectable 40 milliGRAM(s) SubCutaneous every 24 hours  guaiFENesin Oral Liquid (Sugar-Free) 200 milliGRAM(s) Oral every 6 hours PRN  influenza  Vaccine (HIGH DOSE) 0.7 milliLiter(s) IntraMuscular once  levothyroxine 100 MICROGram(s) Oral daily  metoprolol tartrate 12.5 milliGRAM(s) Oral two times a day  predniSONE   Tablet 50 milliGRAM(s) Oral daily  tiotropium 2.5 MICROgram(s) Inhaler 2 Puff(s) Inhalation daily       REVIEW OF SYSTEMS:  CONSTITUTIONAL:  No Fever or chills  HEENT:  No sore throat or runny nose.  CARDIOVASCULAR:  No chest pain   RESPIRATORY:  see history  GASTROINTESTINAL:  No nausea, vomiting or diarrhea. no abdominal pain  GENITOURINARY:  No dysuria, frequency or urgency.   MUSCULOSKELETAL:  no back pain  NEUROLOGIC:  No headache, no dizziness  PSYCHIATRIC:  No disorder of thought or mood.     Physical Exam:  ICU Vital Signs Last 24 Hrs  T(C): 36.9 (30 Dec 2022 05:15), Max: 37.1 (29 Dec 2022 20:52)  T(F): 98.5 (30 Dec 2022 05:15), Max: 98.8 (29 Dec 2022 20:52)  HR: 75 (30 Dec 2022 05:15) (75 - 108)  BP: 137/73 (30 Dec 2022 05:15) (137/73 - 161/73)  BP(mean): --  ABP: --  ABP(mean): --  RR: 19 (30 Dec 2022 05:15) (19 - 19)  SpO2: 94% (30 Dec 2022 10:05) (87% - 96%)    O2 Parameters below as of 30 Dec 2022 10:05  Patient On (Oxygen Delivery Method): nasal cannula  O2 Flow (L/min): 3    GEN: NAD  HEENT: normocephalic and atraumatic.  NECK: Supple.    LUNGS: No wheezes or rhonchi  HEART: Regular rate and rhythm   ABDOMEN: Soft, nontender, and nondistended.    EXTREMITIES: No leg edema.  NEUROLOGIC: grossly intact.  PSYCHIATRIC: Appropriate affect .      Labs:  12-30    144  |  106  |  19  ----------------------------<  99  4.1   |  32<H>  |  0.76    Ca    8.7      30 Dec 2022 07:30  Phos  3.5     12-30  Mg     2.6     12-30    TPro  8.0  /  Alb  3.6  /  TBili  0.4  /  DBili  x   /  AST  32  /  ALT  48  /  AlkPhos  70  12-29                          14.0   14.11 )-----------( 327      ( 30 Dec 2022 07:30 )             45.2     PT/INR - ( 28 Dec 2022 20:04 )   PT: 14.3 sec;   INR: 1.22 ratio         PTT - ( 28 Dec 2022 20:04 )  PTT:30.8 sec    LIVER FUNCTIONS - ( 29 Dec 2022 06:41 )  Alb: 3.6 g/dL / Pro: 8.0 g/dL / ALK PHOS: 70 U/L / ALT: 48 U/L / AST: 32 U/L / GGT: x             RECENT CULTURES:  12-29 @ 17:00          NotDetec  12-28 @ 20:04 .Blood Blood-Peripheral     No growth to date.        12-28 @ 19:55 .Blood Blood-Peripheral     No growth to date.              All imaging and data are reviewed.   
Patient is a 67y old  Female who presents with a chief complaint of Acute hypoxic respiratory failure 2/2 PNA and COPD exacerbation (30 Dec 2022 05:04)       INTERVAL HPI/OVERNIGHT EVENTS: Seen and examined at bedside. Feeling better. C/o cough. Denies chest pain ,palpitation     MEDICATIONS  (STANDING):  budesonide  80 MICROgram(s)/formoterol 4.5 MICROgram(s) Inhaler 2 Puff(s) Inhalation two times a day  cefTRIAXone   IVPB 1000 milliGRAM(s) IV Intermittent every 24 hours  enoxaparin Injectable 40 milliGRAM(s) SubCutaneous every 24 hours  influenza  Vaccine (HIGH DOSE) 0.7 milliLiter(s) IntraMuscular once  levothyroxine 100 MICROGram(s) Oral daily  metoprolol tartrate 12.5 milliGRAM(s) Oral two times a day  predniSONE   Tablet 50 milliGRAM(s) Oral daily  tiotropium 2.5 MICROgram(s) Inhaler 2 Puff(s) Inhalation daily    MEDICATIONS  (PRN):  acetaminophen     Tablet .. 650 milliGRAM(s) Oral every 6 hours PRN Temp greater or equal to 38C (100.4F), Mild Pain (1 - 3)  albuterol    0.083% 2.5 milliGRAM(s) Nebulizer every 3 hours PRN Shortness of Breath and/or Wheezing  guaiFENesin Oral Liquid (Sugar-Free) 200 milliGRAM(s) Oral every 6 hours PRN Cough      Allergies    No Known Allergies    Intolerances        REVIEW OF SYSTEMS:  CONSTITUTIONAL: No fever, weight loss, or fatigue  EYES: No eye pain, visual disturbances, or discharge  ENMT:  No difficulty hearing, tinnitus, vertigo; No sinus or throat pain  NECK: No pain or stiffness  BREASTS: No pain, masses, or nipple discharge  RESPIRATORY: No cough, wheezing, chills or hemoptysis; + shortness of breath  CARDIOVASCULAR: No chest pain, palpitations, dizziness, or leg swelling  GASTROINTESTINAL: No abdominal or epigastric pain. No nausea, vomiting, or hematemesis; No diarrhea or constipation. No melena or hematochezia.  GENITOURINARY: No dysuria, frequency, hematuria, or incontinence  NEUROLOGICAL: No headaches, memory loss, loss of strength, numbness, or tremors  SKIN: No itching, burning, rashes, or lesions   LYMPH NODES: No enlarged glands  ENDOCRINE: No heat or cold intolerance; No hair loss; No polydipsia or polyuria  MUSCULOSKELETAL: No joint pain or swelling; No muscle, back, or extremity pain  PSYCHIATRIC: No depression, anxiety, mood swings, or difficulty sleeping  HEME/LYMPH: No easy bruising, or bleeding gums  ALLERGY AND IMMUNOLOGIC: No hives or eczema      Vital Signs Last 24 Hrs  T(C): 36.9 (30 Dec 2022 05:15), Max: 37.1 (29 Dec 2022 20:52)  T(F): 98.5 (30 Dec 2022 05:15), Max: 98.8 (29 Dec 2022 20:52)  HR: 75 (30 Dec 2022 05:15) (75 - 108)  BP: 137/73 (30 Dec 2022 05:15) (137/73 - 161/73)  BP(mean): --  RR: 19 (30 Dec 2022 05:15) (19 - 20)  SpO2: 94% (30 Dec 2022 05:15) (94% - 96%)    Parameters below as of 30 Dec 2022 05:15  Patient On (Oxygen Delivery Method): nasal cannula  O2 Flow (L/min): 4      PHYSICAL EXAM:  GENERAL: NAD, well-groomed, well-developed  HEAD:  Atraumatic, Normocephalic  EYES: EOMI, PERRLA, conjunctiva and sclera clear  ENMT: No tonsillar erythema, exudates, or enlargement; Moist mucous membranes, Good dentition, No lesions  NECK: Supple, No JVD, Normal thyroid  NERVOUS SYSTEM:  Alert & Oriented X3, Good concentration; Motor Strength 5/5 B/L upper and lower extremities  CHEST/LUNG: Decreased bibasilar breath sounds, no wheezing   HEART: S1S2+, Regular rate and rhythm  ABDOMEN: Soft, Nontender, Nondistended; Bowel sounds present  EXTREMITIES:  2+ Peripheral Pulses, No clubbing, cyanosis, or edema  LYMPH: No lymphadenopathy noted  SKIN: No rashes, warm, dry     LABS:      Ca    8.9        29 Dec 2022 06:41      PT/INR - ( 28 Dec 2022 20:04 )   PT: 14.3 sec;   INR: 1.22 ratio         PTT - ( 28 Dec 2022 20:04 )  PTT:30.8 sec  CAPILLARY BLOOD GLUCOSE        BLOOD CULTURE  12-28 @ 20:04   No growth to date.  --  --  12-28 @ 19:55   No growth to date.  --  --    RADIOLOGY & ADDITIONAL TESTS:    Imaging Personally Reviewed:  [ ] YES     Consultant(s) Notes Reviewed:      Care Discussed with Consultants/Other Providers:

## 2022-12-31 NOTE — PROGRESS NOTE ADULT - PROBLEM SELECTOR PLAN 3
- Continue home Levothyroxine 100mcg

## 2022-12-31 NOTE — PROGRESS NOTE ADULT - TIME BILLING
Note written by attending. Meds, labs, vitals, chart reviewed. Plan d/w Patient, RN

## 2022-12-31 NOTE — DISCHARGE NOTE PROVIDER - NSDCMRMEDTOKEN_GEN_ALL_CORE_FT
acetaminophen 325 mg oral tablet: 2 tab(s) orally every 6 hours, As needed, Temp greater or equal to 38C (100.4F), Mild Pain (1 - 3)  Albuterol (Eqv-ProAir HFA) 90 mcg/inh inhalation aerosol: 2 puff(s) inhaled every 6 hours, As Needed  budesonide-formoterol 80 mcg-4.5 mcg/inh inhalation aerosol: 2 puff(s) inhaled 2 times a day  Bystolic 2.5 mg oral tablet: 1 tab(s) orally once a day (at bedtime)  guaiFENesin 100 mg/5 mL oral liquid: 10 milliliter(s) orally every 6 hours, As needed, Cough  levothyroxine 100 mcg (0.1 mg) oral tablet: 1 tab(s) orally once a day  polyethylene glycol 3350 oral powder for reconstitution: 17 gram(s) orally once a day, As needed, Constipation  predniSONE 50 mg oral tablet: 1 tab(s) orally once a day  tiotropium 2.5 mcg/inh inhalation aerosol: 2 puff(s) inhaled once a day

## 2022-12-31 NOTE — DISCHARGE NOTE NURSING/CASE MANAGEMENT/SOCIAL WORK - NSPROEXTENSIONSOFSELF_GEN_A_NUR
eyeglasses Portable O2 machine as ordered was received by patient and will go home with patient/eyeglasses

## 2022-12-31 NOTE — PROGRESS NOTE ADULT - PROBLEM SELECTOR PLAN 1
Pt presents with SOB, wheezing due to Acute hypoxic respiratory failure 2/2 PNA and COPD exacerbation  - O2 sat in the 80s on RA. On 4L via [place] . Taper as tolerated. Might need home O2. Patient states that does not feel comfortable going home on O2. Will try to taper off as inpatient but if cant then she will need to have home O2 arranged   - RLL infiltrates on CXR   - CT chest result reviewed. Outpatient f/u with Pulm  - Completed course of antibiotics.   - Duonebs q6h as needed   - Negative  RVP, Flu, COVID   - Negative  blood cultures, legionella urine antigen, strep pneumo urine antigen, MRSA PCR  - Tylenol 650 mg PO q6h PRN mild pain and/or fever  - Albuterol PRN SOB and/or wheezing  - Check O2 sat on room air.   - Monitor fever and WBC curve  - ID (Dr. Blakely) and Pulm Dr. Altman

## 2022-12-31 NOTE — PROGRESS NOTE ADULT - ASSESSMENT
67 year old female PMHx HTN, mild COPD, hypothyroidism admitted for acute hypoxic respiratory failure 2/2 PNA and COPD exacerbation. 
67 year old female PMHx HTN, mild COPD, hypothyroidism presents to ED c/o shortness of breath. Pt reports she developed fever/chills, productive cough with white phlegm, myalgias, shortness of breath    dm  copd  pna  obesity  suspect BRITTNEY  thyroid disease  malaise    ct chest reviewed - will need follow up  on rocephin  crp - procal noted  rvp neg  VBG noted - will rpt    biomarkers  emp ABX for poss CAP  prednisone - spiriva - symbicort - Albuterol NEBS for copd ex  VBG  monitor VS and Sat  keep sat > 88 pct  anxiolytics  robitussin prn  reassurance - emotional support
67 year old female PMHx HTN, mild COPD, hypothyroidism presents to ED c/o shortness of breath. Pt reports she developed fever/chills, productive cough with white phlegm, myalgias, shortness of breath    dm  copd  pna  obesity  suspect BRITTNEY  thyroid disease  malaise    wean fio2 as tolerated  check RA sat at rest and on exertion  vs noted  Prednisone 50 mg PO daily - total 5 days    biomarkers  emp ABX for poss CAP  prednisone - spiriva - symbicort - Albuterol NEBS for copd ex  VBG  monitor VS and Sat  keep sat > 88 pct  anxiolytics  robitussin prn  reassurance - emotional support
67 year old female PMHx HTN, COPD, hypothyroidism, who presented with worsening shortness of breath. Recently diagnosed with RLL pneumonia and took 6 days of Levaquin. CT showed scattered small groundglass opacities in both upper and lower lung zones.     Concern for COPD exacerbation in the setting of recent pneumonia. WBC increased to 14 but probably steroid related. She has no fevers and serum procalcitonin and CRP are very low. Urine strep and legionella antigens are negative.    -continue ceftriaxone until today to complete 3 days  -can obtain respiratory cultures  -monitor WBC  -I will be covered by Dr. Blakely on 12/31-1/2/23.    Miranda Darnell MD  Division of Infectious Diseases   Cell [phone] between 8am and 6pm   After 6pm and weekends please call ID service at [phone].   
67 year old female PMHx HTN, mild COPD, hypothyroidism admitted for acute hypoxic respiratory failure 2/2 PNA and COPD exacerbation. 
67 year old female PMHx HTN, mild COPD, hypothyroidism admitted for acute hypoxic respiratory failure 2/2 PNA and acute on chronic COPD exacerbation.

## 2022-12-31 NOTE — PROGRESS NOTE ADULT - REASON FOR ADMISSION
Acute hypoxic respiratory failure 2/2 PNA and COPD exacerbation

## 2022-12-31 NOTE — DISCHARGE NOTE PROVIDER - CARE PROVIDER_API CALL
Arabella Duong)  Critical Care Medicine; Internal Medicine; Pulmonary Disease  [address] [facility], [address]  [place] [address]  Phone: [phone]  Fax: [phone]  Follow Up Time:

## 2022-12-31 NOTE — DISCHARGE NOTE PROVIDER - HOSPITAL COURSE
67 year old female PMHx HTN, mild COPD, hypothyroidism admitted for acute hypoxic respiratory failure 2/2 PNA and acute on chronic COPD exacerbation.      Problem/Plan - 1:  ·  Problem: RLL pneumonia.   ·  Plan: Pt presents with SOB, wheezing due to Acute hypoxic respiratory failure 2/2 PNA and COPD exacerbation  - Needs Home O2.   - Completed course of antibiotics.   - Symbicort, Spriva   - Negative  RVP, Flu, COVID   - Negative  blood cultures, legionella urine antigen, strep pneumo urine antigen, MRSA PCR  - Tylenol 650 mg PO q6h PRN mild pain and/or fever  - Albuterol PRN SOB and/or wheezing     Problem/Plan - 2:  ·  Problem: COPD exacerbation.   ·  Plan: Home O2  Complete course of Prednisone as prescribed      Problem/Plan - 3:  ·  Problem: Hypothyroidism.   ·  Plan: - Continue home Levothyroxine 100mcg.     Problem/Plan - 4:  ·  Problem: Hypertension.   ·  Plan: - Continue  home Bystolic     Total discharge time spent 36 minutes, including medication reconciliation, prescription writing, counseling and education

## 2023-01-02 LAB
CULTURE RESULTS: SIGNIFICANT CHANGE UP
SPECIMEN SOURCE: SIGNIFICANT CHANGE UP

## 2023-01-03 LAB
CULTURE RESULTS: SIGNIFICANT CHANGE UP
CULTURE RESULTS: SIGNIFICANT CHANGE UP
SPECIMEN SOURCE: SIGNIFICANT CHANGE UP
SPECIMEN SOURCE: SIGNIFICANT CHANGE UP

## 2023-02-21 ENCOUNTER — NON-APPOINTMENT (OUTPATIENT)
Age: 68
End: 2023-02-21

## 2023-03-21 NOTE — ED PROVIDER NOTE - CPE EDP [facility] NORM
normal... Opzelura Counseling:  I discussed with the patient the risks of Opzelura including but not limited to nasopharngitis, bronchitis, ear infection, eosinophila, hives, diarrhea, folliculitis, tonsillitis, and rhinorrhea.  Taken orally, this medication has been linked to serious infections; higher rate of mortality; malignancy and lymphoproliferative disorders; major adverse cardiovascular events; thrombosis; thrombocytopenia, anemia, and neutropenia; and lipid elevations.

## 2024-01-12 ENCOUNTER — NON-APPOINTMENT (OUTPATIENT)
Age: 69
End: 2024-01-12

## 2024-06-24 NOTE — ED PROVIDER NOTE - AGGRAVATING FACTORS
-- DO NOT REPLY / DO NOT REPLY ALL --  -- This inbox is not monitored  -- Message is from Engagement Center Operations ([facility]) --      Message Type:  Change or Request for New Medication   Reason:  Request to change from pharmacy [facility] [place]  to [facility] in [place] because of insurance protocols.    escitalopram (Lexapro) 5 MG tablet     Preferred pharmacy verified and selected.   [id number] - [place], [place] - [address]    Patient has been advised the message will be addressed within 2-3 business days.               cough

## 2024-10-08 ENCOUNTER — NON-APPOINTMENT (OUTPATIENT)
Age: 69
End: 2024-10-08

## 2025-04-01 ENCOUNTER — NON-APPOINTMENT (OUTPATIENT)
Age: 70
End: 2025-04-01